# Patient Record
Sex: MALE | Race: WHITE | NOT HISPANIC OR LATINO | Employment: OTHER
[De-identification: names, ages, dates, MRNs, and addresses within clinical notes are randomized per-mention and may not be internally consistent; named-entity substitution may affect disease eponyms.]

---

## 2017-01-05 ENCOUNTER — RECORDS - HEALTHEAST (OUTPATIENT)
Dept: ADMINISTRATIVE | Facility: OTHER | Age: 57
End: 2017-01-05

## 2017-02-10 ENCOUNTER — HOSPITAL ENCOUNTER (OUTPATIENT)
Dept: CT IMAGING | Facility: CLINIC | Age: 57
Discharge: HOME | End: 2017-02-10
Attending: INTERNAL MEDICINE

## 2017-02-10 DIAGNOSIS — C18.9 MALIGNANT NEOPLASM OF COLON, UNSPECIFIED PART OF COLON (H): ICD-10-CM

## 2017-02-17 ENCOUNTER — RECORDS - HEALTHEAST (OUTPATIENT)
Dept: ADMINISTRATIVE | Facility: OTHER | Age: 57
End: 2017-02-17

## 2017-04-17 ENCOUNTER — RECORDS - HEALTHEAST (OUTPATIENT)
Dept: ADMINISTRATIVE | Facility: OTHER | Age: 57
End: 2017-04-17

## 2017-05-01 ENCOUNTER — HOSPITAL ENCOUNTER (OUTPATIENT)
Dept: CT IMAGING | Facility: CLINIC | Age: 57
Discharge: HOME | End: 2017-05-01
Attending: INTERNAL MEDICINE

## 2017-05-01 DIAGNOSIS — C18.9 COLON CANCER (H): ICD-10-CM

## 2017-05-09 ENCOUNTER — RECORDS - HEALTHEAST (OUTPATIENT)
Dept: ADMINISTRATIVE | Facility: OTHER | Age: 57
End: 2017-05-09

## 2017-07-26 ENCOUNTER — RECORDS - HEALTHEAST (OUTPATIENT)
Dept: ADMINISTRATIVE | Facility: OTHER | Age: 57
End: 2017-07-26

## 2017-07-31 ENCOUNTER — RECORDS - HEALTHEAST (OUTPATIENT)
Dept: ADMINISTRATIVE | Facility: OTHER | Age: 57
End: 2017-07-31

## 2017-08-14 ENCOUNTER — HOSPITAL ENCOUNTER (OUTPATIENT)
Dept: CT IMAGING | Facility: CLINIC | Age: 57
Discharge: HOME | End: 2017-08-14
Attending: INTERNAL MEDICINE

## 2017-08-14 ENCOUNTER — HOSPITAL ENCOUNTER (OUTPATIENT)
Dept: MRI IMAGING | Facility: CLINIC | Age: 57
Discharge: HOME | End: 2017-08-14
Attending: NEUROLOGICAL SURGERY

## 2017-08-14 DIAGNOSIS — C18.9 COLON CANCER (H): ICD-10-CM

## 2017-08-14 DIAGNOSIS — I67.1 DAVF (DURAL ARTERIOVENOUS FISTULA): ICD-10-CM

## 2017-08-16 ENCOUNTER — COMMUNICATION - HEALTHEAST (OUTPATIENT)
Dept: INTERNAL MEDICINE | Facility: CLINIC | Age: 57
End: 2017-08-16

## 2017-08-16 DIAGNOSIS — E11.9 DIABETES (H): ICD-10-CM

## 2017-08-21 ENCOUNTER — RECORDS - HEALTHEAST (OUTPATIENT)
Dept: ADMINISTRATIVE | Facility: OTHER | Age: 57
End: 2017-08-21

## 2017-08-29 ENCOUNTER — HOSPITAL ENCOUNTER (OUTPATIENT)
Dept: PET IMAGING | Facility: HOSPITAL | Age: 57
Discharge: HOME | End: 2017-08-29
Attending: INTERNAL MEDICINE

## 2017-08-29 DIAGNOSIS — C20 METASTASIS FROM RECTAL CANCER (H): ICD-10-CM

## 2017-08-29 DIAGNOSIS — C79.9 METASTASIS FROM RECTAL CANCER (H): ICD-10-CM

## 2017-09-18 ENCOUNTER — RECORDS - HEALTHEAST (OUTPATIENT)
Dept: ADMINISTRATIVE | Facility: OTHER | Age: 57
End: 2017-09-18

## 2017-09-22 ENCOUNTER — RECORDS - HEALTHEAST (OUTPATIENT)
Dept: ADMINISTRATIVE | Facility: OTHER | Age: 57
End: 2017-09-22

## 2017-09-26 ENCOUNTER — HOSPITAL ENCOUNTER (OUTPATIENT)
Dept: RESPIRATORY THERAPY | Facility: CLINIC | Age: 57
Discharge: HOME | End: 2017-09-26
Attending: RADIOLOGY

## 2017-09-26 DIAGNOSIS — C78.00 LUNG METASTASES: ICD-10-CM

## 2017-10-10 ENCOUNTER — RECORDS - HEALTHEAST (OUTPATIENT)
Dept: ADMINISTRATIVE | Facility: OTHER | Age: 57
End: 2017-10-10

## 2017-10-11 ENCOUNTER — COMMUNICATION - HEALTHEAST (OUTPATIENT)
Dept: INTERNAL MEDICINE | Facility: CLINIC | Age: 57
End: 2017-10-11

## 2017-10-11 DIAGNOSIS — E11.9 DIABETES (H): ICD-10-CM

## 2017-12-15 ENCOUNTER — OFFICE VISIT - HEALTHEAST (OUTPATIENT)
Dept: INTERNAL MEDICINE | Facility: CLINIC | Age: 57
End: 2017-12-15

## 2017-12-15 DIAGNOSIS — R41.3 MEMORY LOSS: ICD-10-CM

## 2017-12-15 DIAGNOSIS — G40.909 SEIZURE DISORDER (H): ICD-10-CM

## 2017-12-15 DIAGNOSIS — I80.9 THROMBOPHLEBITIS: ICD-10-CM

## 2017-12-15 DIAGNOSIS — C20 MALIGNANT NEOPLASM OF RECTUM (H): ICD-10-CM

## 2017-12-15 DIAGNOSIS — E11.9 TYPE 2 DIABETES MELLITUS (H): ICD-10-CM

## 2017-12-15 LAB — HBA1C MFR BLD: 9 % (ref 3.5–6)

## 2017-12-15 ASSESSMENT — MIFFLIN-ST. JEOR: SCORE: 2065.62

## 2018-01-02 ENCOUNTER — RECORDS - HEALTHEAST (OUTPATIENT)
Dept: ADMINISTRATIVE | Facility: OTHER | Age: 58
End: 2018-01-02

## 2018-01-02 ENCOUNTER — COMMUNICATION - HEALTHEAST (OUTPATIENT)
Dept: INTERNAL MEDICINE | Facility: CLINIC | Age: 58
End: 2018-01-02

## 2018-01-02 DIAGNOSIS — E11.9 DIABETES (H): ICD-10-CM

## 2018-01-08 ENCOUNTER — RECORDS - HEALTHEAST (OUTPATIENT)
Dept: ADMINISTRATIVE | Facility: OTHER | Age: 58
End: 2018-01-08

## 2018-03-29 ENCOUNTER — COMMUNICATION - HEALTHEAST (OUTPATIENT)
Dept: SCHEDULING | Facility: CLINIC | Age: 58
End: 2018-03-29

## 2018-03-29 ENCOUNTER — COMMUNICATION - HEALTHEAST (OUTPATIENT)
Dept: INTERNAL MEDICINE | Facility: CLINIC | Age: 58
End: 2018-03-29

## 2018-03-30 ENCOUNTER — COMMUNICATION - HEALTHEAST (OUTPATIENT)
Dept: INTERNAL MEDICINE | Facility: CLINIC | Age: 58
End: 2018-03-30

## 2018-04-02 ENCOUNTER — COMMUNICATION - HEALTHEAST (OUTPATIENT)
Dept: INTERNAL MEDICINE | Facility: CLINIC | Age: 58
End: 2018-04-02

## 2018-04-05 ENCOUNTER — RECORDS - HEALTHEAST (OUTPATIENT)
Dept: ADMINISTRATIVE | Facility: OTHER | Age: 58
End: 2018-04-05

## 2018-04-06 ENCOUNTER — RECORDS - HEALTHEAST (OUTPATIENT)
Dept: ADMINISTRATIVE | Facility: OTHER | Age: 58
End: 2018-04-06

## 2018-04-23 ENCOUNTER — HOSPITAL ENCOUNTER (OUTPATIENT)
Dept: CT IMAGING | Facility: CLINIC | Age: 58
Discharge: HOME | End: 2018-04-23
Attending: INTERNAL MEDICINE

## 2018-04-23 DIAGNOSIS — C18.9 COLON CANCER (H): ICD-10-CM

## 2018-04-23 LAB
CREAT BLD-MCNC: 0.9 MG/DL
POC GFR AMER AF HE - HISTORICAL: >60 ML/MIN/1.73M2
POC GFR NON AMER AF HE - HISTORICAL: >60 ML/MIN/1.73M2

## 2018-06-28 ENCOUNTER — RECORDS - HEALTHEAST (OUTPATIENT)
Dept: ADMINISTRATIVE | Facility: OTHER | Age: 58
End: 2018-06-28

## 2018-08-13 ENCOUNTER — HOSPITAL ENCOUNTER (OUTPATIENT)
Dept: CT IMAGING | Facility: CLINIC | Age: 58
Discharge: HOME | End: 2018-08-13

## 2018-08-13 ENCOUNTER — TRANSFERRED RECORDS (OUTPATIENT)
Dept: HEALTH INFORMATION MANAGEMENT | Facility: CLINIC | Age: 58
End: 2018-08-13

## 2018-08-13 DIAGNOSIS — C18.9 COLON CANCER (H): ICD-10-CM

## 2018-08-13 LAB
CREAT BLD-MCNC: 0.8 MG/DL
POC GFR AMER AF HE - HISTORICAL: >60 ML/MIN/1.73M2
POC GFR NON AMER AF HE - HISTORICAL: >60 ML/MIN/1.73M2

## 2018-08-29 ENCOUNTER — OFFICE VISIT - HEALTHEAST (OUTPATIENT)
Dept: INTERNAL MEDICINE | Facility: CLINIC | Age: 58
End: 2018-08-29

## 2018-08-29 DIAGNOSIS — E66.01 MORBID OBESITY (H): ICD-10-CM

## 2018-08-29 DIAGNOSIS — E11.9 DIABETES MELLITUS (H): ICD-10-CM

## 2018-08-29 DIAGNOSIS — C78.00 METASTASIS TO LUNG (H): ICD-10-CM

## 2018-08-29 DIAGNOSIS — Z12.5 SCREENING FOR PROSTATE CANCER: ICD-10-CM

## 2018-08-29 DIAGNOSIS — Z12.11 SCREEN FOR COLON CANCER: ICD-10-CM

## 2018-08-29 DIAGNOSIS — E11.9 DIABETES (H): ICD-10-CM

## 2018-08-29 DIAGNOSIS — E11.9 TYPE 2 DIABETES MELLITUS (H): ICD-10-CM

## 2018-08-29 LAB
ALBUMIN SERPL-MCNC: 3.7 G/DL (ref 3.5–5)
ALP SERPL-CCNC: 76 U/L (ref 45–120)
ALT SERPL W P-5'-P-CCNC: 58 U/L (ref 0–45)
ANION GAP SERPL CALCULATED.3IONS-SCNC: 11 MMOL/L (ref 5–18)
AST SERPL W P-5'-P-CCNC: 31 U/L (ref 0–40)
BILIRUB SERPL-MCNC: 1.2 MG/DL (ref 0–1)
BUN SERPL-MCNC: 12 MG/DL (ref 8–22)
CALCIUM SERPL-MCNC: 9.4 MG/DL (ref 8.5–10.5)
CHLORIDE BLD-SCNC: 105 MMOL/L (ref 98–107)
CHOLEST SERPL-MCNC: 213 MG/DL
CO2 SERPL-SCNC: 26 MMOL/L (ref 22–31)
CREAT SERPL-MCNC: 0.88 MG/DL (ref 0.7–1.3)
CREAT UR-MCNC: 215.7 MG/DL
FASTING STATUS PATIENT QL REPORTED: YES
GFR SERPL CREATININE-BSD FRML MDRD: >60 ML/MIN/1.73M2
GLUCOSE BLD-MCNC: 164 MG/DL (ref 70–125)
HBA1C MFR BLD: 7.9 % (ref 3.5–6)
HDLC SERPL-MCNC: 36 MG/DL
LDLC SERPL CALC-MCNC: 126 MG/DL
MICROALBUMIN UR-MCNC: 1.1 MG/DL (ref 0–1.99)
MICROALBUMIN/CREAT UR: 5.1 MG/G
POTASSIUM BLD-SCNC: 4.3 MMOL/L (ref 3.5–5)
PROT SERPL-MCNC: 6.8 G/DL (ref 6–8)
PSA SERPL-MCNC: 0.5 NG/ML (ref 0–3.5)
SODIUM SERPL-SCNC: 142 MMOL/L (ref 136–145)
TRIGL SERPL-MCNC: 257 MG/DL

## 2018-08-29 ASSESSMENT — MIFFLIN-ST. JEOR: SCORE: 2073.84

## 2018-09-25 ENCOUNTER — RECORDS - HEALTHEAST (OUTPATIENT)
Dept: ADMINISTRATIVE | Facility: OTHER | Age: 58
End: 2018-09-25

## 2018-12-12 ENCOUNTER — RECORDS - HEALTHEAST (OUTPATIENT)
Dept: ADMINISTRATIVE | Facility: OTHER | Age: 58
End: 2018-12-12

## 2018-12-12 ENCOUNTER — HOSPITAL ENCOUNTER (OUTPATIENT)
Dept: CT IMAGING | Facility: CLINIC | Age: 58
Discharge: HOME | End: 2018-12-12
Attending: INTERNAL MEDICINE

## 2018-12-12 DIAGNOSIS — C18.9 MALIGNANT NEOPLASM OF COLON, UNSPECIFIED PART OF COLON (H): ICD-10-CM

## 2018-12-19 ENCOUNTER — RECORDS - HEALTHEAST (OUTPATIENT)
Dept: ADMINISTRATIVE | Facility: OTHER | Age: 58
End: 2018-12-19

## 2019-03-18 ENCOUNTER — HOSPITAL ENCOUNTER (OUTPATIENT)
Dept: CT IMAGING | Facility: CLINIC | Age: 59
Discharge: HOME | End: 2019-03-18
Attending: INTERNAL MEDICINE

## 2019-03-18 DIAGNOSIS — C18.9 COLON CANCER (H): ICD-10-CM

## 2019-03-25 ENCOUNTER — RECORDS - HEALTHEAST (OUTPATIENT)
Dept: ADMINISTRATIVE | Facility: OTHER | Age: 59
End: 2019-03-25

## 2019-04-04 ENCOUNTER — COMMUNICATION - HEALTHEAST (OUTPATIENT)
Dept: INTERNAL MEDICINE | Facility: CLINIC | Age: 59
End: 2019-04-04

## 2019-04-04 DIAGNOSIS — E11.40 TYPE 2 DIABETES MELLITUS WITH DIABETIC NEUROPATHY, WITHOUT LONG-TERM CURRENT USE OF INSULIN (H): ICD-10-CM

## 2019-04-22 ENCOUNTER — RECORDS - HEALTHEAST (OUTPATIENT)
Dept: ADMINISTRATIVE | Facility: OTHER | Age: 59
End: 2019-04-22

## 2019-05-29 ENCOUNTER — COMMUNICATION - HEALTHEAST (OUTPATIENT)
Dept: FAMILY MEDICINE | Facility: CLINIC | Age: 59
End: 2019-05-29

## 2019-06-17 ENCOUNTER — HOSPITAL ENCOUNTER (OUTPATIENT)
Dept: CT IMAGING | Facility: CLINIC | Age: 59
Discharge: HOME | End: 2019-06-17
Attending: INTERNAL MEDICINE

## 2019-06-17 ENCOUNTER — OFFICE VISIT - HEALTHEAST (OUTPATIENT)
Dept: INTERNAL MEDICINE | Facility: CLINIC | Age: 59
End: 2019-06-17

## 2019-06-17 DIAGNOSIS — E11.9 TYPE 2 DIABETES MELLITUS WITHOUT COMPLICATION, WITH LONG-TERM CURRENT USE OF INSULIN (H): ICD-10-CM

## 2019-06-17 DIAGNOSIS — C78.00 MALIGNANT NEOPLASM METASTATIC TO LUNG, UNSPECIFIED LATERALITY (H): ICD-10-CM

## 2019-06-17 DIAGNOSIS — Z79.4 TYPE 2 DIABETES MELLITUS WITHOUT COMPLICATION, WITH LONG-TERM CURRENT USE OF INSULIN (H): ICD-10-CM

## 2019-06-17 DIAGNOSIS — C18.9 COLON CANCER (H): ICD-10-CM

## 2019-06-17 DIAGNOSIS — C78.7 LIVER METASTASIS: ICD-10-CM

## 2019-06-17 DIAGNOSIS — C20 RECTAL CANCER (H): ICD-10-CM

## 2019-06-17 LAB
CREAT BLD-MCNC: 0.9 MG/DL (ref 0.7–1.3)
CREAT UR-MCNC: 44.2 MG/DL
GFR SERPL CREATININE-BSD FRML MDRD: >60 ML/MIN/1.73M2
HBA1C MFR BLD: 7.4 % (ref 3.5–6)
LDLC SERPL CALC-MCNC: 150 MG/DL
MICROALBUMIN UR-MCNC: <0.5 MG/DL (ref 0–1.99)
MICROALBUMIN/CREAT UR: NORMAL MG/G

## 2019-06-27 ENCOUNTER — RECORDS - HEALTHEAST (OUTPATIENT)
Dept: ADMINISTRATIVE | Facility: OTHER | Age: 59
End: 2019-06-27

## 2019-07-06 ENCOUNTER — COMMUNICATION - HEALTHEAST (OUTPATIENT)
Dept: INTERNAL MEDICINE | Facility: CLINIC | Age: 59
End: 2019-07-06

## 2019-07-06 DIAGNOSIS — E11.9 DIABETES (H): ICD-10-CM

## 2019-07-09 ENCOUNTER — RECORDS - HEALTHEAST (OUTPATIENT)
Dept: ADMINISTRATIVE | Facility: OTHER | Age: 59
End: 2019-07-09

## 2019-07-11 ENCOUNTER — HOSPITAL ENCOUNTER (OUTPATIENT)
Dept: RESPIRATORY THERAPY | Facility: CLINIC | Age: 59
Discharge: HOME | End: 2019-07-11
Attending: RADIOLOGY

## 2019-07-11 DIAGNOSIS — C78.00 MALIGNANT NEOPLASM METASTATIC TO LUNG, UNSPECIFIED LATERALITY (H): ICD-10-CM

## 2019-07-11 LAB — HGB BLD-MCNC: 15.6 G/DL (ref 14–18)

## 2019-07-25 ENCOUNTER — COMMUNICATION - HEALTHEAST (OUTPATIENT)
Dept: INTERNAL MEDICINE | Facility: CLINIC | Age: 59
End: 2019-07-25

## 2019-07-30 ENCOUNTER — OFFICE VISIT - HEALTHEAST (OUTPATIENT)
Dept: INTERNAL MEDICINE | Facility: CLINIC | Age: 59
End: 2019-07-30

## 2019-07-30 DIAGNOSIS — Z12.11 SCREEN FOR COLON CANCER: ICD-10-CM

## 2019-07-30 DIAGNOSIS — Z01.818 PRE-OP EXAM: ICD-10-CM

## 2019-07-30 LAB
ATRIAL RATE - MUSE: 59 BPM
DIASTOLIC BLOOD PRESSURE - MUSE: NORMAL MMHG
INTERPRETATION ECG - MUSE: NORMAL
P AXIS - MUSE: 27 DEGREES
PR INTERVAL - MUSE: 164 MS
QRS DURATION - MUSE: 94 MS
QT - MUSE: 404 MS
QTC - MUSE: 399 MS
R AXIS - MUSE: 12 DEGREES
SYSTOLIC BLOOD PRESSURE - MUSE: NORMAL MMHG
T AXIS - MUSE: 46 DEGREES
VENTRICULAR RATE- MUSE: 59 BPM

## 2019-08-02 ENCOUNTER — RECORDS - HEALTHEAST (OUTPATIENT)
Dept: ADMINISTRATIVE | Facility: OTHER | Age: 59
End: 2019-08-02

## 2019-08-08 ENCOUNTER — COMMUNICATION - HEALTHEAST (OUTPATIENT)
Dept: INTERNAL MEDICINE | Facility: CLINIC | Age: 59
End: 2019-08-08

## 2019-08-08 DIAGNOSIS — E11.9 DIABETES (H): ICD-10-CM

## 2019-08-14 ENCOUNTER — RECORDS - HEALTHEAST (OUTPATIENT)
Dept: ADMINISTRATIVE | Facility: OTHER | Age: 59
End: 2019-08-14

## 2019-08-30 ENCOUNTER — COMMUNICATION - HEALTHEAST (OUTPATIENT)
Dept: INTERNAL MEDICINE | Facility: CLINIC | Age: 59
End: 2019-08-30

## 2019-09-04 ENCOUNTER — COMMUNICATION - HEALTHEAST (OUTPATIENT)
Dept: INTERNAL MEDICINE | Facility: CLINIC | Age: 59
End: 2019-09-04

## 2019-10-08 ENCOUNTER — HOSPITAL ENCOUNTER (OUTPATIENT)
Dept: CT IMAGING | Facility: CLINIC | Age: 59
Discharge: HOME | End: 2019-10-08
Attending: INTERNAL MEDICINE

## 2019-10-08 DIAGNOSIS — C18.9 MALIGNANT NEOPLASM OF COLON, UNSPECIFIED PART OF COLON (H): ICD-10-CM

## 2019-10-08 LAB
CREAT BLD-MCNC: 0.8 MG/DL (ref 0.7–1.3)
GFR SERPL CREATININE-BSD FRML MDRD: >60 ML/MIN/1.73M2

## 2019-10-10 ENCOUNTER — COMMUNICATION - HEALTHEAST (OUTPATIENT)
Dept: INTERNAL MEDICINE | Facility: CLINIC | Age: 59
End: 2019-10-10

## 2019-10-10 DIAGNOSIS — E11.9 DIABETES MELLITUS (H): ICD-10-CM

## 2019-10-25 ENCOUNTER — COMMUNICATION - HEALTHEAST (OUTPATIENT)
Dept: INTERNAL MEDICINE | Facility: CLINIC | Age: 59
End: 2019-10-25

## 2019-12-02 ENCOUNTER — RECORDS - HEALTHEAST (OUTPATIENT)
Dept: ADMINISTRATIVE | Facility: OTHER | Age: 59
End: 2019-12-02

## 2019-12-19 ENCOUNTER — RECORDS - HEALTHEAST (OUTPATIENT)
Dept: ADMINISTRATIVE | Facility: OTHER | Age: 59
End: 2019-12-19

## 2019-12-30 ENCOUNTER — HOSPITAL ENCOUNTER (OUTPATIENT)
Dept: CT IMAGING | Facility: CLINIC | Age: 59
Discharge: HOME | End: 2019-12-30
Attending: INTERNAL MEDICINE

## 2019-12-30 DIAGNOSIS — C20 RECTAL CANCER (H): ICD-10-CM

## 2020-01-06 ENCOUNTER — RECORDS - HEALTHEAST (OUTPATIENT)
Dept: ADMINISTRATIVE | Facility: OTHER | Age: 60
End: 2020-01-06

## 2020-01-07 ENCOUNTER — RECORDS - HEALTHEAST (OUTPATIENT)
Dept: ADMINISTRATIVE | Facility: OTHER | Age: 60
End: 2020-01-07

## 2020-02-06 ENCOUNTER — COMMUNICATION - HEALTHEAST (OUTPATIENT)
Dept: INTERNAL MEDICINE | Facility: CLINIC | Age: 60
End: 2020-02-06

## 2020-02-06 DIAGNOSIS — Z79.4 TYPE 2 DIABETES MELLITUS WITHOUT COMPLICATION, WITH LONG-TERM CURRENT USE OF INSULIN (H): ICD-10-CM

## 2020-02-06 DIAGNOSIS — E11.9 TYPE 2 DIABETES MELLITUS WITHOUT COMPLICATION, WITH LONG-TERM CURRENT USE OF INSULIN (H): ICD-10-CM

## 2020-03-13 ENCOUNTER — RECORDS - HEALTHEAST (OUTPATIENT)
Dept: ADMINISTRATIVE | Facility: OTHER | Age: 60
End: 2020-03-13

## 2020-03-23 ENCOUNTER — HOSPITAL ENCOUNTER (OUTPATIENT)
Dept: CT IMAGING | Facility: CLINIC | Age: 60
Discharge: HOME | End: 2020-03-23
Attending: INTERNAL MEDICINE

## 2020-03-23 DIAGNOSIS — C20 RECTAL CANCER (H): ICD-10-CM

## 2020-03-23 LAB
CREAT BLD-MCNC: 0.9 MG/DL (ref 0.7–1.3)
GFR SERPL CREATININE-BSD FRML MDRD: >60 ML/MIN/1.73M2

## 2020-03-30 ENCOUNTER — RECORDS - HEALTHEAST (OUTPATIENT)
Dept: ADMINISTRATIVE | Facility: OTHER | Age: 60
End: 2020-03-30

## 2020-06-17 ENCOUNTER — HOSPITAL ENCOUNTER (OUTPATIENT)
Dept: CT IMAGING | Facility: CLINIC | Age: 60
Discharge: HOME | End: 2020-06-17
Attending: INTERNAL MEDICINE

## 2020-06-17 DIAGNOSIS — C20 MALIGNANT NEOPLASM OF RECTUM (H): ICD-10-CM

## 2020-07-14 ENCOUNTER — RECORDS - HEALTHEAST (OUTPATIENT)
Dept: ADMINISTRATIVE | Facility: OTHER | Age: 60
End: 2020-07-14

## 2020-07-20 ENCOUNTER — OFFICE VISIT - HEALTHEAST (OUTPATIENT)
Dept: FAMILY MEDICINE | Facility: CLINIC | Age: 60
End: 2020-07-20

## 2020-07-20 DIAGNOSIS — Z79.4 TYPE 2 DIABETES MELLITUS WITHOUT COMPLICATION, WITH LONG-TERM CURRENT USE OF INSULIN (H): ICD-10-CM

## 2020-07-20 DIAGNOSIS — E11.9 TYPE 2 DIABETES MELLITUS WITHOUT COMPLICATION, WITH LONG-TERM CURRENT USE OF INSULIN (H): ICD-10-CM

## 2020-07-20 DIAGNOSIS — G40.909 SEIZURE DISORDER (H): ICD-10-CM

## 2020-07-20 DIAGNOSIS — C20 RECTAL CANCER (H): ICD-10-CM

## 2020-07-20 DIAGNOSIS — E66.01 MORBID OBESITY (H): ICD-10-CM

## 2020-07-20 DIAGNOSIS — Z01.818 PREOPERATIVE EXAMINATION: ICD-10-CM

## 2020-07-20 DIAGNOSIS — C78.00 MALIGNANT NEOPLASM METASTATIC TO LUNG, UNSPECIFIED LATERALITY (H): ICD-10-CM

## 2020-07-20 LAB
ANION GAP SERPL CALCULATED.3IONS-SCNC: 8 MMOL/L (ref 5–18)
ATRIAL RATE - MUSE: 63 BPM
BASOPHILS # BLD AUTO: 0 THOU/UL (ref 0–0.2)
BASOPHILS NFR BLD AUTO: 1 % (ref 0–2)
BUN SERPL-MCNC: 10 MG/DL (ref 8–22)
CALCIUM SERPL-MCNC: 8.7 MG/DL (ref 8.5–10.5)
CHLORIDE BLD-SCNC: 106 MMOL/L (ref 98–107)
CO2 SERPL-SCNC: 26 MMOL/L (ref 22–31)
CREAT SERPL-MCNC: 0.82 MG/DL (ref 0.7–1.3)
DIASTOLIC BLOOD PRESSURE - MUSE: NORMAL
EOSINOPHIL # BLD AUTO: 0.2 THOU/UL (ref 0–0.4)
EOSINOPHIL NFR BLD AUTO: 4 % (ref 0–6)
ERYTHROCYTE [DISTWIDTH] IN BLOOD BY AUTOMATED COUNT: 12.3 % (ref 11–14.5)
GFR SERPL CREATININE-BSD FRML MDRD: >60 ML/MIN/1.73M2
GLUCOSE BLD-MCNC: 158 MG/DL (ref 70–125)
HBA1C MFR BLD: 8 % (ref 3.5–6)
HCT VFR BLD AUTO: 45.9 % (ref 40–54)
HGB BLD-MCNC: 15.3 G/DL (ref 14–18)
INTERPRETATION ECG - MUSE: NORMAL
LYMPHOCYTES # BLD AUTO: 1.1 THOU/UL (ref 0.8–4.4)
LYMPHOCYTES NFR BLD AUTO: 24 % (ref 20–40)
MCH RBC QN AUTO: 29.6 PG (ref 27–34)
MCHC RBC AUTO-ENTMCNC: 33.3 G/DL (ref 32–36)
MCV RBC AUTO: 89 FL (ref 80–100)
MONOCYTES # BLD AUTO: 0.4 THOU/UL (ref 0–0.9)
MONOCYTES NFR BLD AUTO: 9 % (ref 2–10)
NEUTROPHILS # BLD AUTO: 2.8 THOU/UL (ref 2–7.7)
NEUTROPHILS NFR BLD AUTO: 63 % (ref 50–70)
P AXIS - MUSE: 38 DEGREES
PLATELET # BLD AUTO: 158 THOU/UL (ref 140–440)
PMV BLD AUTO: 7.3 FL (ref 7–10)
POTASSIUM BLD-SCNC: 4.4 MMOL/L (ref 3.5–5)
PR INTERVAL - MUSE: 166 MS
QRS DURATION - MUSE: 98 MS
QT - MUSE: 420 MS
QTC - MUSE: 429 MS
R AXIS - MUSE: 17 DEGREES
RBC # BLD AUTO: 5.17 MILL/UL (ref 4.4–6.2)
SODIUM SERPL-SCNC: 140 MMOL/L (ref 136–145)
SYSTOLIC BLOOD PRESSURE - MUSE: NORMAL
T AXIS - MUSE: 43 DEGREES
VENTRICULAR RATE- MUSE: 63 BPM
WBC: 4.4 THOU/UL (ref 4–11)

## 2020-09-09 ENCOUNTER — HOSPITAL ENCOUNTER (OUTPATIENT)
Dept: CT IMAGING | Facility: CLINIC | Age: 60
Discharge: HOME | End: 2020-09-09
Attending: INTERNAL MEDICINE

## 2020-09-09 DIAGNOSIS — C20 RECTAL CANCER (H): ICD-10-CM

## 2020-09-09 LAB
CREAT BLD-MCNC: 0.9 MG/DL (ref 0.7–1.3)
GFR SERPL CREATININE-BSD FRML MDRD: >60 ML/MIN/1.73M2

## 2020-09-16 ENCOUNTER — RECORDS - HEALTHEAST (OUTPATIENT)
Dept: ADMINISTRATIVE | Facility: OTHER | Age: 60
End: 2020-09-16

## 2020-09-30 ENCOUNTER — RECORDS - HEALTHEAST (OUTPATIENT)
Dept: ADMINISTRATIVE | Facility: OTHER | Age: 60
End: 2020-09-30

## 2020-12-07 ENCOUNTER — HOSPITAL ENCOUNTER (OUTPATIENT)
Dept: CT IMAGING | Facility: CLINIC | Age: 60
Discharge: HOME | End: 2020-12-07
Attending: INTERNAL MEDICINE

## 2020-12-07 DIAGNOSIS — C20 RECTAL CANCER (H): ICD-10-CM

## 2020-12-07 LAB
CREAT BLD-MCNC: 0.9 MG/DL (ref 0.7–1.3)
GFR SERPL CREATININE-BSD FRML MDRD: >60 ML/MIN/1.73M2

## 2020-12-16 ENCOUNTER — RECORDS - HEALTHEAST (OUTPATIENT)
Dept: ADMINISTRATIVE | Facility: OTHER | Age: 60
End: 2020-12-16

## 2020-12-17 ENCOUNTER — RECORDS - HEALTHEAST (OUTPATIENT)
Dept: ADMINISTRATIVE | Facility: OTHER | Age: 60
End: 2020-12-17

## 2021-02-13 ENCOUNTER — COMMUNICATION - HEALTHEAST (OUTPATIENT)
Dept: INTERNAL MEDICINE | Facility: CLINIC | Age: 61
End: 2021-02-13

## 2021-02-13 DIAGNOSIS — E11.9 TYPE 2 DIABETES MELLITUS WITHOUT COMPLICATION, WITH LONG-TERM CURRENT USE OF INSULIN (H): ICD-10-CM

## 2021-02-13 DIAGNOSIS — Z79.4 TYPE 2 DIABETES MELLITUS WITHOUT COMPLICATION, WITH LONG-TERM CURRENT USE OF INSULIN (H): ICD-10-CM

## 2021-02-25 ENCOUNTER — RECORDS - HEALTHEAST (OUTPATIENT)
Dept: ADMINISTRATIVE | Facility: OTHER | Age: 61
End: 2021-02-25

## 2021-03-08 ENCOUNTER — OFFICE VISIT - HEALTHEAST (OUTPATIENT)
Dept: INTERNAL MEDICINE | Facility: CLINIC | Age: 61
End: 2021-03-08

## 2021-03-08 DIAGNOSIS — G62.0 CHEMOTHERAPY-INDUCED PERIPHERAL NEUROPATHY (H): ICD-10-CM

## 2021-03-08 DIAGNOSIS — Z79.4 TYPE 2 DIABETES MELLITUS WITHOUT COMPLICATION, WITH LONG-TERM CURRENT USE OF INSULIN (H): ICD-10-CM

## 2021-03-08 DIAGNOSIS — C20 RECTAL CANCER (H): ICD-10-CM

## 2021-03-08 DIAGNOSIS — E11.9 TYPE 2 DIABETES MELLITUS WITHOUT COMPLICATION, WITH LONG-TERM CURRENT USE OF INSULIN (H): ICD-10-CM

## 2021-03-08 DIAGNOSIS — T45.1X5A CHEMOTHERAPY-INDUCED PERIPHERAL NEUROPATHY (H): ICD-10-CM

## 2021-03-08 LAB
ALBUMIN SERPL-MCNC: 3.6 G/DL (ref 3.5–5)
ALP SERPL-CCNC: 69 U/L (ref 45–120)
ALT SERPL W P-5'-P-CCNC: 49 U/L (ref 0–45)
ANION GAP SERPL CALCULATED.3IONS-SCNC: 9 MMOL/L (ref 5–18)
AST SERPL W P-5'-P-CCNC: 23 U/L (ref 0–40)
BILIRUB SERPL-MCNC: 0.8 MG/DL (ref 0–1)
BUN SERPL-MCNC: 12 MG/DL (ref 8–22)
CALCIUM SERPL-MCNC: 8.5 MG/DL (ref 8.5–10.5)
CHLORIDE BLD-SCNC: 105 MMOL/L (ref 98–107)
CHOLEST SERPL-MCNC: 186 MG/DL
CO2 SERPL-SCNC: 26 MMOL/L (ref 22–31)
CREAT SERPL-MCNC: 0.83 MG/DL (ref 0.7–1.3)
CREAT UR-MCNC: 228.1 MG/DL
FASTING STATUS PATIENT QL REPORTED: ABNORMAL
GFR SERPL CREATININE-BSD FRML MDRD: >60 ML/MIN/1.73M2
GLUCOSE BLD-MCNC: 168 MG/DL (ref 70–125)
HBA1C MFR BLD: 7.6 %
HDLC SERPL-MCNC: 36 MG/DL
LDLC SERPL CALC-MCNC: 119 MG/DL
MICROALBUMIN UR-MCNC: 1.12 MG/DL (ref 0–1.99)
MICROALBUMIN/CREAT UR: 4.9 MG/G
POTASSIUM BLD-SCNC: 4.4 MMOL/L (ref 3.5–5)
PROT SERPL-MCNC: 6.6 G/DL (ref 6–8)
SODIUM SERPL-SCNC: 140 MMOL/L (ref 136–145)
TRIGL SERPL-MCNC: 154 MG/DL

## 2021-03-14 RX ORDER — ATORVASTATIN CALCIUM 40 MG/1
40 TABLET, FILM COATED ORAL DAILY
Qty: 90 TABLET | Refills: 1 | Status: SHIPPED | OUTPATIENT
Start: 2021-03-14 | End: 2021-08-31

## 2021-03-15 ENCOUNTER — HOSPITAL ENCOUNTER (OUTPATIENT)
Dept: CT IMAGING | Facility: CLINIC | Age: 61
Discharge: HOME | End: 2021-03-15
Attending: INTERNAL MEDICINE

## 2021-03-15 DIAGNOSIS — C20 RECTAL CANCER (H): ICD-10-CM

## 2021-03-22 ENCOUNTER — RECORDS - HEALTHEAST (OUTPATIENT)
Dept: ADMINISTRATIVE | Facility: OTHER | Age: 61
End: 2021-03-22

## 2021-03-23 ENCOUNTER — AMBULATORY - HEALTHEAST (OUTPATIENT)
Dept: NURSING | Facility: CLINIC | Age: 61
End: 2021-03-23

## 2021-03-26 ENCOUNTER — RECORDS - HEALTHEAST (OUTPATIENT)
Dept: ADMINISTRATIVE | Facility: OTHER | Age: 61
End: 2021-03-26

## 2021-04-13 ENCOUNTER — AMBULATORY - HEALTHEAST (OUTPATIENT)
Dept: NURSING | Facility: CLINIC | Age: 61
End: 2021-04-13

## 2021-05-27 NOTE — TELEPHONE ENCOUNTER
RN cannot approve Refill Request    RN can NOT refill this medication overdue for office visits and/or labs.    Mundo Figueroa, [facility] Triage/Med Refill 4/4/2019    Requested Prescriptions   Pending Prescriptions Disp Refills     BASAGLAR KWIKPEN U-100 INSULIN 100 unit/mL (3 mL) pen [Pharmacy Med Name: BASAGLAR 100 UNIT/ML KWIKPEN]  9     Sig: INJECT 65 UNITS UNDER THE SKIN AT BEDTIME.    Insulin/GLP-1 Refill Protocol Failed - 4/4/2019  1:32 AM       Failed - Visit with PCP or prescribing provider visit in last 6 months    Last office visit with prescriber/PCP: Visit date not found OR same dept: 8/29/2018 Darrius Lagunas MD OR same specialty: 8/29/2018 Darrius Lagunas MD Last physical: Visit date not found Last MTM visit: Visit date not found     Next appt within 3 mo: Visit date not found  Next physical within 3 mo: Visit date not found  Prescriber OR PCP: Jordy Espinal MD  Last diagnosis associated with med order: There are no diagnoses linked to this encounter.  If protocol passes may refill for 6 months if within 3 months of last provider visit (or a total of 9 months).             Failed - A1C in last 6 months    Hemoglobin A1c   Date Value Ref Range Status   08/29/2018 7.9 (H) 3.5 - 6.0 % Final              Passed - Microalbumin in last year    Microalbumin, Random Urine   Date Value Ref Range Status   08/29/2018 1.10 0.00 - 1.99 mg/dL Final                 Passed - Blood pressure in last year    BP Readings from Last 1 Encounters:   08/29/18 118/82            Passed - Creatinine done in last year    Creatinine   Date Value Ref Range Status   08/29/2018 0.88 0.70 - 1.30 mg/dL Final

## 2021-05-29 NOTE — PROGRESS NOTES
ASSESSMENT and PLAN:  #1.  Type 2 diabetes, degree of control be determined.  Check a hemoglobin A1c, LDL, and urine microalbumin.  I will call him with results and further recommendations.    2.  History of rectal cancer with both lung and liver metastases.  He just had a CT scan performed this morning and is following up with his oncologist, Dr. Gipson, in the next couple of weeks.    #3.  Hyperlipidemia.  Last LDL was 126 in August 2018.  Go to recheck it today and if it is elevated we will need to get him started on a statin.  He is open to this.    Problem List Items Addressed This Visit     Rectal cancer (H)    Type 2 diabetes mellitus (H) - Primary    Relevant Orders    Glycosylated Hemoglobin A1c    LDL Cholesterol, Direct    Microalbumin, Random Urine    Liver metastasis (H)    Metastasis to lung (H)          There are no Patient Instructions on file for this visit.    There are no discontinued medications.    No follow-ups on file.    CHIEF COMPLAINT:  Chief Complaint   Patient presents with     Diabetes     fasting       HISTORY OF PRESENT ILLNESS:  Hero Harris is a 59 y.o. male  presenting to the clinic today for follow-up of his type 2 diabetes.  He is currently taking 46 units of Basaglar along with 12 to 16 units of NovoLog with every meal.  He does not check his blood sugar.  He denies any symptomatic hypoglycemia or need for assistance.  Last hemoglobin A1c in August was 7.9.  Last LDL at that time was 126.  Urine microalbumin was normal then.  He sees his oncologist every 3 months and just had a CT scan this morning.  Unfortunately do not have any records from his last few visits, last records that we have are from September 2018.    REVIEW OF SYSTEMS:   Pertinent positives noted in HPI, remainder of ROS is negative.    PFSH:      MEDICATIONS:  Current Outpatient Medications   Medication Sig Dispense Refill     BASAGLAR KWIKPEN U-100 INSULIN 100 unit/mL (3 mL) pen INJECT 65 UNITS UNDER THE  "SKIN AT BEDTIME. (Patient taking differently: INJECT 46 UNITS UNDER THE SKIN AT BEDTIME.) 15 adj dose pen 9     blood glucose test (ACCU-CHEK SMARTVIEW TEST STRIP) strips Use 1 each As Directed as needed. 100 strip 3     insulin aspart U-100 (NOVOLOG FLEXPEN U-100 INSULIN) 100 unit/mL injection pen INJECT 12 UNITS UNDER THE SKIN THREE TIMES DAILY BEFORE MEALS (Patient taking differently: 12-16 Units. INJECT 12 UNITS UNDER THE SKIN THREE TIMES DAILY BEFORE MEALS      ) 30 mL 2     levETIRAcetam (KEPPRA) 750 MG tablet Take 750 mg by mouth 2 (two) times a day.       pen needle, diabetic (BD ULTRA-FINE SHORT PEN NEEDLE) 31 gauge x 5/16\" Ndle USE AS DIRECTED FOR INSULIN INJECTIONS 4X DAILY 400 each 2     warfarin (COUMADIN) 5 MG tablet Take 5-7.5 mg by mouth See Admin Instructions. 5 mg Sat & Sun, 7.5mg all other days       No current facility-administered medications for this visit.        TOBACCO USE:  Social History     Tobacco Use   Smoking Status Never Smoker   Smokeless Tobacco Never Used       VITALS:  Vitals:    06/17/19 0943   BP: 120/82   Pulse: 68   Weight: (!) 270 lb (122.5 kg)     Wt Readings from Last 3 Encounters:   06/17/19 (!) 270 lb (122.5 kg)   08/29/18 (!) 272 lb (123.4 kg)   12/15/17 (!) 270 lb 3.2 oz (122.6 kg)         PHYSICAL EXAM:  Constitutional:  Reveals an alert, pleasant  male.   Vitals:  Per nursing notes.   Body mass index is 37.13 kg/m .  HEET: Normocephalic, without obvious abnormality, atraumatic. PERRL, conjunctiva/corneas clear, EOM's intact. External canals, TMs clear.   Neurologic: Sensation is mildly decreased to monofilament testing in the feet bilaterally.      "

## 2021-05-29 NOTE — TELEPHONE ENCOUNTER
Please fill out Diabetic Form for Driving and call patient @ [phone] when it is completed to come and . Form placed at [facility]  provider mailbox. Thank you

## 2021-05-29 NOTE — TELEPHONE ENCOUNTER
Pt informed he can  form at his convenience. Form is at . Pt also scheduled for diabetes follow up with PCP for 6/17/19

## 2021-05-30 NOTE — TELEPHONE ENCOUNTER
Refill Approved    Rx renewed per Medication Renewal Policy. Medication was last renewed on 8/29/2018 .        Jean Pike, [facility] Triage/Med Refill 7/7/2019     Requested Prescriptions   Pending Prescriptions Disp Refills     insulin aspart U-100 (NOVOLOG FLEXPEN U-100 INSULIN) 100 unit/mL (3 mL) injection pen [Pharmacy Med Name: NOVOLOG 100 UNIT/ML FLEXPEN]  3     Sig: INJECT 12 UNITS UNDER THE SKIN THREE TIMES DAILY BEFORE MEALS       Insulin/GLP-1 Refill Protocol Passed - 7/6/2019  8:39 AM        Passed - Visit with PCP or prescribing provider visit in last 6 months     Last office visit with prescriber/PCP: 6/17/2019 OR same dept: 6/17/2019 Darrius Lagunas MD OR same specialty: 6/17/2019 Darrius Lagunas MD Last physical: Visit date not found Last MTM visit: Visit date not found     Next appt within 3 mo: Visit date not found  Next physical within 3 mo: Visit date not found  Prescriber OR PCP: Darrius Lagunas MD  Last diagnosis associated with med order: 1. Diabetes (H)  - NOVOLOG FLEXPEN U-100 INSULIN 100 unit/mL (3 mL) injection pen [Pharmacy Med Name: NOVOLOG 100 UNIT/ML FLEXPEN]; INJECT 12 UNITS UNDER THE SKIN THREE TIMES DAILY BEFORE MEALS; Refill: 3    If protocol passes may refill for 6 months if within 3 months of last provider visit (or a total of 9 months).              Passed - A1C in last 6 months     Hemoglobin A1c   Date Value Ref Range Status   06/17/2019 7.4 (H) 3.5 - 6.0 % Final               Passed - Microalbumin in last year     Microalbumin, Random Urine   Date Value Ref Range Status   06/17/2019 <0.50 0.00 - 1.99 mg/dL Final                  Passed - Blood pressure in last year     BP Readings from Last 1 Encounters:   06/17/19 120/82             Passed - Creatinine done in last year     Creatinine   Date Value Ref Range Status   08/29/2018 0.88 0.70 - 1.30 mg/dL Final

## 2021-05-30 NOTE — PROGRESS NOTES
Preoperative Exam    Scheduled Procedure: Right Vats, Poss Limited Thoracotomy Wedge Resection, Right Upper Lung Nodules  Surgery Date:  8/1/19  Surgery Location: [facility] Fax: [phone]  Surgeon:  Dr. Giraldo    Assessment/Plan:     Satisfactory medical examination to proceed with right sided video-assisted thoracoscopic surgery and anticipated wedge resection for right lung nodules believed to be metastases of colon cancer.  Medically has been very stable.  Not experiencing any cardiac or pulmonary symptoms.    Hemoglobin 15.6 measured July 11, 2019  Creatinine 0.9 measured June 17, 2019  EKG taken today, read by me, is normal.    Diabetes has been reasonably controlled.  His regimen has been 46 units of Basaglar are daily, which he takes in the evening.  I asked him to cut that dose in half the evening before surgery, therefore take 23 units.  The morning of surgery he will be n.p.o.  Obviously, do not take any short acting insulin, until you are eating again.    He should take his Keppra with a small sip of water.    Regarding history of pulmonary emboli in 2010 (for which she is been on long-term warfarin), he took his last evening dose of warfarin on Friday, July 26.   Therefore he had NO warfarin on Saturday 27th, Sunday 28th, Monday 29th, and he will take no warfarin tonight Tuesday 30th, no warfarin Wednesday the 31st. He will have surgery Thursday, August 1.  The plan that he is discussed with his surgeon and medical oncologist is that he will start Lovenox soon postoperatively and also restart oral warfarin, and stay on Lovenox until his INR is therapeutic at 2-3.      Surgical Procedure Risk: Low (reported cardiac risk generally < 1%)  Have you had prior anesthesia?: Yes  Have you or any family members had a previous anesthesia reaction:  No  Do you or any family members have a history of a clotting or bleeding disorder?: Yes: Bilateral blood clots 7-8 years ago  Cardiac Risk Assessment: no  increased risk for major cardiac complications    APPROVAL GIVEN to proceed with proposed procedure, without further diagnostic evaluation    Functional Status: Independent  Patient plans to recover at home with family.     Subjective:      Hero Harris is a 59 y.o. male retired  who presents for a preoperative consultation.    All other systems reviewed and are negative, other than those listed in the HPI.  Hemoglobin 15.6 measured July 11, 2019  Creatinine 0.9 measured June 17, 2019 6-17-19 CT  now clearly a nodule with some adjacent atelectasis. The nodule on image 90 of series 3 measures 2.4 x 1.3  CONCLUSION:  1.  Right lower lobe pulmonary nodule likely represents a metastasis. This is not readily amenable to percutaneous biopsy given location adjacent to the diaphragm.     [facility]  Surgeon Eugene Giraldo MD  Had 2 lung resection 2016 by Dr Giraldo, no anaesthesia complications.    He has radiation therapist to (he recalls) near maximum doses to lung tissue.  But he feels his exercise tolerance pretty good.  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 In 2016 he had bridging.    He has discussed anticoagulation with surgeon Dr Giraldo and med oncologist Dr Amirah Gipson at [facility]  Told he could stop warfarin, he took last dose Saturday July 27.  Plan is that he will be started on Lovenox post-op when Dr Giraldo approves.    INR is managed at [facility]  He has a port in left subclavian vein    Seizure disorder  On Keppra after seizure few year ago--single incident, there is been no recurrence.  He does drive    Type 2 diabetes  He told me he takes 46 basaglar.  He is currently taking 46 units of Basaglar along with 12 to 16 units of NovoLog with every meal  I told him to talk half dose evening before surgery.    #1.  Type 2  "DM  insulin aspart U-100 (NOVOLOG FLEXPEN U-100 INSULIN) 100 unit/mL (3 mL) injection pen INJECT 12 UNITS UNDER THE SKIN THREE TIMES DAILY BEFORE MEALS     BASAGLAR KWIKPEN U-100 INSULIN 100 unit/mL (3 mL) pen INJECT 65 UNITS UNDER THE SKIN AT BEDTIME. (Patient taking differently: INJECT 46 UNITS UNDER THE SKIN AT BEDTIME.)     Lab Results   Component Value Date    HGBA1C 7.4 (H) 06/17/2019     No cardiac history.    Hyperlipidemia.  Last LDL was 126 in August 2018  Not yet started simvastatin.     Pertinent History  Do you have difficulty breathing or chest pain after walking up a flight of stairs: No  History of obstructive sleep apnea: No  Steroid use in the last 6 months: No  Frequent Aspirin/NSAID use: No, Takes warfarin  Prior Blood Transfusion: Yes, unsure when  Prior Blood Transfusion Reaction: No  If for some reason prior to, during or after the procedure, if it is medically indicated, would you be willing to have a blood transfusion?:  There is no transfusion refusal.       Current Outpatient Medications   Medication Sig Dispense Refill     BASAGLAR KWIKPEN U-100 INSULIN 100 unit/mL (3 mL) pen INJECT 65 UNITS UNDER THE SKIN AT BEDTIME. (Patient taking differently: INJECT 46 UNITS UNDER THE SKIN AT BEDTIME.) 15 adj dose pen 9     blood glucose test (ACCU-CHEK SMARTVIEW TEST STRIP) strips Use 1 each As Directed as needed. 100 strip 3     insulin aspart U-100 (NOVOLOG FLEXPEN U-100 INSULIN) 100 unit/mL (3 mL) injection pen INJECT 12 UNITS UNDER THE SKIN THREE TIMES DAILY BEFORE MEALS 3 mL 3     levETIRAcetam (KEPPRA) 750 MG tablet Take 750 mg by mouth 2 (two) times a day.       pen needle, diabetic (BD ULTRA-FINE SHORT PEN NEEDLE) 31 gauge x 5/16\" Ndle USE AS DIRECTED FOR INSULIN INJECTIONS 4X DAILY 400 each 2     simvastatin (ZOCOR) 20 MG tablet Take 1 tablet (20 mg total) by mouth every evening. 30 tablet 11     warfarin (COUMADIN) 5 MG tablet Take 5-7.5 mg by mouth See Admin Instructions. 5 mg Sat & Sun, " 7.5mg all other days       No current facility-administered medications for this visit.         Allergies   Allergen Reactions     Perfume      House Dust Other (See Comments)     Rhinitis     Perfume Ht52 Other (See Comments)     Runny nose       Patient Active Problem List   Diagnosis     Idiopathic Intracranial Hypertension     Rectal cancer (H)     Type 2 diabetes mellitus (H)     Seizure disorder (H)     Pulmonary emboli (H)     Liver metastasis (H)     Metastasis to lung (H)     Morbid obesity (H)       No past medical history on file.    Past Surgical History:   Procedure Laterality Date     ABDOMINOPERINEAL PROCTOCOLECTOMY       COLOSTOMY       LIVER RESECTION       NH PELVIC EXENTERATION COMPLETE, OTHER MALIG      Description: Abdominoperineal Resection;  Recorded: 05/10/2012;       Social History     Socioeconomic History     Marital status:      Spouse name: Not on file     Number of children: Not on file     Years of education: Not on file     Highest education level: Not on file   Occupational History     Not on file   Social Needs     Financial resource strain: Not on file     Food insecurity:     Worry: Not on file     Inability: Not on file     Transportation needs:     Medical: Not on file     Non-medical: Not on file   Tobacco Use     Smoking status: Never Smoker     Smokeless tobacco: Never Used   Substance and Sexual Activity     Alcohol use: Yes     Comment: occasional     Drug use: No     Sexual activity: Not on file   Lifestyle     Physical activity:     Days per week: Not on file     Minutes per session: Not on file     Stress: Not on file   Relationships     Social connections:     Talks on phone: Not on file     Gets together: Not on file     Attends Muslim service: Not on file     Active member of club or organization: Not on file     Attends meetings of clubs or organizations: Not on file     Relationship status: Not on file     Intimate partner violence:     Fear of current or  ex partner: Not on file     Emotionally abused: Not on file     Physically abused: Not on file     Forced sexual activity: Not on file   Other Topics Concern     Not on file   Social History Narrative    11/16/2015 - The patient is a  by profession.          Objective:     /88 (Patient Site: Right Arm, Patient Position: Sitting, Cuff Size: Adult Large)   Pulse 72   Temp 98.5  F (36.9  C) (Oral)   Wt (!) 268 lb 4.8 oz (121.7 kg)   SpO2 96%   BMI 36.90 kg/m        Physical Exam:  Very pleasant, appears comfortable, no distress, moderately overweight  Oropharynx clear, good dentition  Neck with normal range of motion  Lungs clear  Heart regular rate and rhythm, no murmur  Extremities no edema  Venous access port palpable in the left upper chest wall  Abdomen nontender.  Colostomy present in left lower quadrant    There are no Patient Instructions on file for this visit.    EKG: Normal sinus rhythm 59 bpm.  Normal EKG.    Labs:    Hemoglobin 15.6 measured July 11, 2019  Creatinine 0.9 measured June 17, 2019    Immunization History   Administered Date(s) Administered     DT (pediatric) 01/01/1997     Influenza, inj, historic,unspecified 10/20/2015, 10/30/2017     Influenza, seasonal,quad inj 36+ mos 10/20/2015, 09/22/2016     Pneumo Polysac 23-V 05/16/2012     Td,adult,historic,unspecified 01/01/1997     Tdap 09/14/2010       Electronically signed by David Ozuna MD 07/30/19 9:27 AM

## 2021-05-30 NOTE — PATIENT INSTRUCTIONS - HE
Satisfactory medical examination to proceed with right video-assisted thoracoscopic surgery and anticipated wedge resection for right lung nodules believed to be metastases of colon cancer.  Medically has been very stable.  Not experiencing any cardiac or pulmonary symptoms.  Diabetes has been reasonably controlled.    His regimen has been 46 units of Basaglar are daily, which he takes in the evening.  I asked him to cut that dose in half the evening before surgery, therefore take 23 units.    The morning of surgery he will be n.p.o.  Obviously, do not take any short acting insulin, until you are eating again.    He should take his Keppra with a small sip of water.    Regarding history of pulmonary emboli in 2010 (for which she is been on long-term warfarin), he took his last evening dose of warfarin on Friday, July 26.  Therefore he had no warfarin on Saturday the 27th, Sunday 28, Monday 29, and he will take no warfarin tonight Tuesday the 30th, no warfarin Wednesday the 31st, and he will have surgery Thursday, August 1.  The plan that he is discussed with his surgeon and medical oncologist is that he will start Lovenox soon postoperatively and also restart oral warfarin, and stay on Lovenox until his INR is therapeutic at 2-3.

## 2021-05-30 NOTE — PROGRESS NOTES
RESPIRATORY CARE NOTE     Patient Name: Hero Harris  Today's Date: 7/11/2019     Complete PFT done. Pt performed tests with good effort. Albuterol neb given.Test results meet ATS criteria. Results scanned into epic. Pt left in no distress.       Treva Mata, LRT    RESPIRATORY CARE NOTE     Patient Name: Hero Harris  Today's Date: 7/11/2019     Problem List  Patient Active Problem List   Diagnosis     Idiopathic Intracranial Hypertension     Rectal cancer (H)     Type 2 diabetes mellitus (H)     Seizure disorder (H)     Pulmonary emboli (H)     Liver metastasis (H)     Metastasis to lung (H)     Morbid obesity (H)                           Treva Mata LRT

## 2021-05-30 NOTE — TELEPHONE ENCOUNTER
New Appointment Needed  What is the reason for the visit:    Pre-Op Appt Request  When is the surgery? :  08.01.19  Where is the surgery?:   [facility]  Who is the surgeon? :  Dr. Giraldo  What type of surgery is being done?: lung resection  Provider Preference: Any available  How soon do you need to be seen?: per patient he's available to see any provider who has openings.  He is scheduled with Dr. Ozuna on 07/30 at 9 am per his request. Is this okay with Dr. Lagunas?  Waitlist offered?: No  Okay to leave a detailed message:  Yes

## 2021-05-31 VITALS — BODY MASS INDEX: 37.83 KG/M2 | WEIGHT: 270.2 LBS | HEIGHT: 71 IN

## 2021-05-31 NOTE — TELEPHONE ENCOUNTER
Refill Approved    Rx renewed per Medication Renewal Policy. Medication was last renewed on 7/7/19.    Luli Braun, [facility] Connection Triage/Med Refill 8/8/2019     Requested Prescriptions   Pending Prescriptions Disp Refills     insulin aspart U-100 (NOVOLOG FLEXPEN U-100 INSULIN) 100 unit/mL (3 mL) injection pen [Pharmacy Med Name: NOVOLOG 100 UNIT/ML FLEXPEN]  0     Sig: INJECT 12 UNITS UNDER THE SKIN THREE TIMES DAILY BEFORE MEALS       Insulin/GLP-1 Refill Protocol Passed - 8/8/2019  1:35 AM        Passed - Visit with PCP or prescribing provider visit in last 6 months     Last office visit with prescriber/PCP: 6/17/2019 OR same dept: 6/17/2019 Darrius Lagunas MD OR same specialty: 6/17/2019 Darrius Lagunas MD Last physical: Visit date not found Last MTM visit: Visit date not found     Next appt within 3 mo: Visit date not found  Next physical within 3 mo: Visit date not found  Prescriber OR PCP: Darrius Lagunas MD  Last diagnosis associated with med order: 1. Diabetes (H)  - NOVOLOG FLEXPEN U-100 INSULIN 100 unit/mL (3 mL) injection pen [Pharmacy Med Name: NOVOLOG 100 UNIT/ML FLEXPEN]; INJECT 12 UNITS UNDER THE SKIN THREE TIMES DAILY BEFORE MEALS; Refill: 0    If protocol passes may refill for 6 months if within 3 months of last provider visit (or a total of 9 months).              Passed - A1C in last 6 months     Hemoglobin A1c   Date Value Ref Range Status   06/17/2019 7.4 (H) 3.5 - 6.0 % Final               Passed - Microalbumin in last year     Microalbumin, Random Urine   Date Value Ref Range Status   06/17/2019 <0.50 0.00 - 1.99 mg/dL Final                  Passed - Blood pressure in last year     BP Readings from Last 1 Encounters:   07/30/19 128/88             Passed - Creatinine done in last year     Creatinine   Date Value Ref Range Status   08/29/2018 0.88 0.70 - 1.30 mg/dL Final

## 2021-06-02 ENCOUNTER — RECORDS - HEALTHEAST (OUTPATIENT)
Dept: ADMINISTRATIVE | Facility: CLINIC | Age: 61
End: 2021-06-02

## 2021-06-02 VITALS — HEIGHT: 72 IN | BODY MASS INDEX: 36.84 KG/M2 | WEIGHT: 272 LBS

## 2021-06-02 NOTE — TELEPHONE ENCOUNTER
"Refill Approved    Rx renewed per Medication Renewal Policy. Medication was last renewed on 8/29/18.    Monica Ovalles, [facility] Triage/Med Refill 10/11/2019     Requested Prescriptions   Pending Prescriptions Disp Refills     pen needle, diabetic (BD ULTRA-FINE SHORT PEN NEEDLE) 31 gauge x 5/16\" Ndle 400 each 2     Sig: USE AS DIRECTED FOR INSULIN INJECTIONS 4X DAILY       Diabetic Supplies Refill Protocol Passed - 10/10/2019  3:54 PM        Passed - Visit with PCP or prescribing provider visit in last 6 months     Last office visit with prescriber/PCP: 6/17/2019 Darrius Lagunas MD OR same dept: 6/17/2019 Darrius Lagunas MD OR same specialty: 6/17/2019 Darrius Lagunas MD  Last physical: Visit date not found Last MTM visit: Visit date not found   Next visit within 3 mo: Visit date not found  Next physical within 3 mo: Visit date not found  Prescriber OR PCP: Darrius Lagunas MD  Last diagnosis associated with med order: 1. Diabetes mellitus (H)  - pen needle, diabetic (BD ULTRA-FINE SHORT PEN NEEDLE) 31 gauge x 5/16\" Ndle; USE AS DIRECTED FOR INSULIN INJECTIONS 4X DAILY  Dispense: 400 each; Refill: 2    If protocol passes may refill for 12 months if within 3 months of last provider visit (or a total of 15 months).             Passed - A1C in last 6 months     Hemoglobin A1c   Date Value Ref Range Status   06/17/2019 7.4 (H) 3.5 - 6.0 % Final                           "

## 2021-06-02 NOTE — TELEPHONE ENCOUNTER
Left VM with [facility] medical to refax form to direct fax line  ADORE Ferreira  3:39 PM ........ 10/25/2019

## 2021-06-02 NOTE — TELEPHONE ENCOUNTER
Who is calling:  Amirah with [facility], [phone]  Reason for Call:    Amirah is questioning if clinic has received the order for Ostomy supplies for patient.  She says she faxed the order on 10/10/19.  Provider who ordered supplies is Dr Espinal who patient saw on 4/4/19 at [facility].  Please reach out to Amirah and advise.  Date of last appointment with primary care: 7/30/19  Okay to leave a detailed message: Yes

## 2021-06-03 ENCOUNTER — RECORDS - HEALTHEAST (OUTPATIENT)
Dept: ADMINISTRATIVE | Facility: CLINIC | Age: 61
End: 2021-06-03

## 2021-06-03 VITALS — BODY MASS INDEX: 37.13 KG/M2 | WEIGHT: 270 LBS

## 2021-06-03 VITALS — BODY MASS INDEX: 36.9 KG/M2 | WEIGHT: 268.3 LBS

## 2021-06-04 VITALS
SYSTOLIC BLOOD PRESSURE: 137 MMHG | BODY MASS INDEX: 36.58 KG/M2 | WEIGHT: 266 LBS | HEART RATE: 69 BPM | DIASTOLIC BLOOD PRESSURE: 69 MMHG | OXYGEN SATURATION: 97 %

## 2021-06-05 ENCOUNTER — RECORDS - HEALTHEAST (OUTPATIENT)
Dept: SCHEDULING | Facility: CLINIC | Age: 61
End: 2021-06-05

## 2021-06-05 VITALS
WEIGHT: 265 LBS | BODY MASS INDEX: 36.44 KG/M2 | DIASTOLIC BLOOD PRESSURE: 84 MMHG | SYSTOLIC BLOOD PRESSURE: 118 MMHG | HEART RATE: 72 BPM

## 2021-06-05 DIAGNOSIS — C18.9 MALIGNANT NEOPLASM OF COLON (H): ICD-10-CM

## 2021-06-05 NOTE — TELEPHONE ENCOUNTER
Medication Question or Clarification  Who is calling: Patient  What medication are you calling about (include dose and sig)?:   BASAGLAR KWIKPEN U-100 INSULIN 100 unit/mL (3 mL) pen 15 adj dose pen 9 4/4/2019     Sig: INJECT 65 UNITS UNDER THE SKIN AT BEDTIME.    Patient taking differently: INJECT 46 UNITS UNDER THE SKIN AT BEDTIME.        Sent to pharmacy as: BASAGLAR KWIKPEN U-100 INSULIN 100 unit/mL (3 mL) pen    E-Prescribing Status: Receipt confirmed by pharmacy (4/4/2019  4:23 PM CDT)      insulin aspart U-100 (NOVOLOG FLEXPEN U-100 INSULIN) 100 unit/mL (3 mL) injection pen 12 Pre-filled Pen Syringe 1 8/8/2019     Sig: INJECT 12 UNITS UNDER THE SKIN THREE TIMES DAILY BEFORE MEALS    Sent to pharmacy as: insulin aspart U-100 (NOVOLOG FLEXPEN U-100 INSULIN) 100 unit/mL (3 mL) injection pen    E-Prescribing Status: Receipt confirmed by pharmacy (8/8/2019  6:29 PM CDT)        Who prescribed the medication?:   Darrius Lagunas MD    What is your question/concern?:   Patient's insurance company is no longer covering above medications.    Patient's insurance company will cover:  Lantus  Humalog    Please send new scripts for Lantus and Humalog to patient's Pharmacy.  Thank you.    Requested Pharmacy: [facility] #[id number]    Okay to leave a detailed message?: Yes

## 2021-06-06 ENCOUNTER — COMMUNICATION - HEALTHEAST (OUTPATIENT)
Dept: INTERNAL MEDICINE | Facility: CLINIC | Age: 61
End: 2021-06-06

## 2021-06-06 DIAGNOSIS — Z79.4 TYPE 2 DIABETES MELLITUS WITHOUT COMPLICATION, WITH LONG-TERM CURRENT USE OF INSULIN (H): ICD-10-CM

## 2021-06-06 DIAGNOSIS — E11.9 TYPE 2 DIABETES MELLITUS WITHOUT COMPLICATION, WITH LONG-TERM CURRENT USE OF INSULIN (H): ICD-10-CM

## 2021-06-07 RX ORDER — INSULIN GLARGINE 100 [IU]/ML
INJECTION, SOLUTION SUBCUTANEOUS
Qty: 15 ML | Refills: 3 | Status: SHIPPED | OUTPATIENT
Start: 2021-06-07 | End: 2021-10-12

## 2021-06-09 NOTE — PROGRESS NOTES
Preoperative Exam    Scheduled Procedure: cerebral angiogram  Surgery Date:  7/30/20  Surgery Location: [facility]    Surgeon:  Dr Gutierrez    Assessment/Plan:     1. Preoperative examination  No contraindications for planned procedure.    2. Seizure disorder (H)  He plans on having a cerebral angiogram as a part of his routine monitoring of a chronic seizure disorder that has been relatively well controlled over the last few years.  He is on Keppra.    3. Type 2 diabetes mellitus without complication, with long-term current use of insulin (H)  Hemoglobin A1c from a year ago was 7.2.  He does not routinely check his blood sugars.  We will recheck a hemoglobin A1c today.  He will reduce his Lantus down to 23 units the evening before his procedure.  No short acting insulin the day of.    4. Malignant neoplasm metastatic to lung, unspecified laterality (H)  He has had 2 lung wedge resections due to metastatic colon cancer and now has a chronic colostomy bag.  He feels good today in terms of his breathing    5. Morbid obesity (H)  He has never had a sleep study.  He does not think he has sleep apnea.        Surgical Procedure Risk: Intermediate (reported cardiac risk generally 1-5%)  Have you had prior anesthesia?: Yes  Have you or any family members had a previous anesthesia reaction:  No  Do you or any family members have a history of a clotting or bleeding disorder?: No  Cardiac Risk Assessment: increased risk for major cardiac complications based on  diabetes mellitus requiring use of insulin    Pending review of diagnostic evaluation, APPROVAL GIVEN to proceed with proposed procedure, without further diagnostic evaluation.    Please Note:  Patient has an implanted device.  Device Type: Inez cath      Device Vendor:  NA    Functional Status: Independent  Patient plans to recover at home with family.     Subjective:      Hero Harris is a 60 y.o. male who presents for a preoperative consultation.   "    Please see above.  Patient otherwise feeling well today and has no acute complaints.      All other systems reviewed and are negative, other than those listed in the HPI.    Pertinent History  Do you have difficulty breathing or chest pain after walking up a flight of stairs: No  History of obstructive sleep apnea: No  Steroid use in the last 6 months: No  Frequent Aspirin/NSAID use: No  Prior Blood Transfusion: No  Prior Blood Transfusion Reaction: No  If for some reason prior to, during or after the procedure, if it is medically indicated, would you be willing to have a blood transfusion?:  There is no transfusion refusal.    Current Outpatient Medications   Medication Sig Dispense Refill     BASAGLAR KWIKPEN U-100 INSULIN 100 unit/mL (3 mL) pen INJECT 65 UNITS UNDER THE SKIN AT BEDTIME. (Patient taking differently: INJECT 46 UNITS UNDER THE SKIN AT BEDTIME.) 15 adj dose pen 9     insulin glargine (LANTUS SOLOSTAR U-100 INSULIN) 100 unit/mL (3 mL) pen Inject 65 Units under the skin daily. Do not mix Lantus with any other insulin (Patient taking differently: Inject 46 Units under the skin daily. Do not mix Lantus with any other insulin) 20 adj dose pen 3     insulin lispro (HUMALOG KWIKPEN INSULIN) 100 unit/mL pen Inject 12 Units under the skin 3 times daily before meal. 12 adj dose pen 3     levETIRAcetam (KEPPRA) 750 MG tablet Take 750 mg by mouth 2 (two) times a day.       warfarin (COUMADIN) 5 MG tablet Take 5-7.5 mg by mouth See Admin Instructions. 5 mg Sat & Sun, 7.5mg all other days       blood glucose test (ACCU-CHEK SMARTVIEW TEST STRIP) strips Use 1 each As Directed as needed. 100 strip 3     insulin aspart U-100 (NOVOLOG FLEXPEN U-100 INSULIN) 100 unit/mL (3 mL) injection pen INJECT 12 UNITS UNDER THE SKIN THREE TIMES DAILY BEFORE MEALS 12 Pre-filled Pen Syringe 1     pen needle, diabetic (BD ULTRA-FINE SHORT PEN NEEDLE) 31 gauge x 5/16\" Ndle USE AS DIRECTED FOR INSULIN INJECTIONS 4X DAILY 400 each 3 "     simvastatin (ZOCOR) 20 MG tablet Take 1 tablet (20 mg total) by mouth every evening. 30 tablet 11     No current facility-administered medications for this visit.         Allergies   Allergen Reactions     Perfume      House Dust Other (See Comments)     Rhinitis     Perfume Ht52 Other (See Comments)     Runny nose       Patient Active Problem List   Diagnosis     Idiopathic Intracranial Hypertension     Rectal cancer (H)     Type 2 diabetes mellitus (H)     Seizure disorder (H)     Pulmonary emboli (H)     Liver metastasis (H)     Metastasis to lung (H)     Morbid obesity (H)       No past medical history on file.    Past Surgical History:   Procedure Laterality Date     ABDOMINOPERINEAL PROCTOCOLECTOMY       COLOSTOMY       LIVER RESECTION       VT PELVIC EXENTERATION COMPLETE, OTHER MALIG      Description: Abdominoperineal Resection;  Recorded: 05/10/2012;       Social History     Socioeconomic History     Marital status:      Spouse name: Not on file     Number of children: Not on file     Years of education: Not on file     Highest education level: Not on file   Occupational History     Not on file   Social Needs     Financial resource strain: Not on file     Food insecurity     Worry: Not on file     Inability: Not on file     Transportation needs     Medical: Not on file     Non-medical: Not on file   Tobacco Use     Smoking status: Never Smoker     Smokeless tobacco: Never Used   Substance and Sexual Activity     Alcohol use: Yes     Comment: occasional     Drug use: No     Sexual activity: Not on file   Lifestyle     Physical activity     Days per week: Not on file     Minutes per session: Not on file     Stress: Not on file   Relationships     Social connections     Talks on phone: Not on file     Gets together: Not on file     Attends Zoroastrian service: Not on file     Active member of club or organization: Not on file     Attends meetings of clubs or organizations: Not on file     Relationship  status: Not on file     Intimate partner violence     Fear of current or ex partner: Not on file     Emotionally abused: Not on file     Physically abused: Not on file     Forced sexual activity: Not on file   Other Topics Concern     Not on file   Social History Narrative    11/16/2015 - The patient is a  by profession.       Patient Care Team:  Darrius Lagunas MD as PCP - General (Internal Medicine)  David Ozuna MD as Assigned PCP          Objective:     Vitals:    07/20/20 0854   BP: 137/69   Pulse: 69   SpO2: 97%   Weight: (!) 266 lb (120.7 kg)         Physical Exam:  General: No apparent distress. Calm. Alert and Oriented X3. Pt behavior is appropriate.  Head:Atraumatic. Normocephalic, non-tender to palpation  Neck: Supple. No JVD. Full ROM.   Eyes: PERRL, No discharge. No strabismus. No nystagmus.  Ears: TMs pearly gray with landmarks visible.   Nose/Mouth/Throat: Patent nares, no oral lesions, pharynx clear and without exudate. Uvula mid-line. Nasal septum mid-line. Clear turbinates.   Lymph: No axillar or cervical adenopathy.   Chest/Lungs: Normal chest wall, clear to auscultation, normal respiratory effort and rate.   Heart/Pulses: Regular rate and rhythm, strong and equal radial pulses, no murmurs. Capillary refill <2 seconds. No edema.   Abdomen: Obese.  Colostomy in place in left lower quadrant.  Soft, no palpable masses. No hepatosplenomegaly, no tenderness with palpation noted. Bowel sounds active in all quadrants. No increased tympany.   Genitalia: Not examined.   Musculoskeletal: No CVA tenderness with palpation. Good ROM with extremities.   Neurologic: Interactive, alert, no focal findings, CNs intact.   Skin: Warm, dry. Normal hair pattern. Free of lesions. Normal skin turgor.         Patient Instructions   Reduce Lantus insulin to 23 units the evening before your procedure.    No short acting insulin on the morning of your procedure.    Stop warfarin 4 days prior to your  procedure.  Resume day of.    Schedule a virtual visit with PCP to discuss statin    Hold all supplements, aspirin and NSAIDs for 7 days prior to surgery.    Follow your surgeon's direction on when to stop eating and drinking prior to surgery.    Your surgeon will be managing your pain after your surgery.      Remove all jewelry and metal piercings before your surgery.     Remove nail polish from fingers before surgery.      EKG:    Most Recent EKG     Units 07/20/20  0903   VENTRATE BPM 63   ATRIALRATE BPM 63   QRSDURATION ms 98   QTINTERVAL ms 420   QTCCALC ms 429   P Axis degrees 38   RAXIS degrees 17   TAXIS degrees 43   MUSEDX  Normal sinus rhythm  Normal ECG  When compared with ECG of 30-JUL-2019 10:16,  No significant change was found           Labs:  Labs pending at this time.  Results will be reviewed when available.    Immunization History   Administered Date(s) Administered     DT (pediatric) 01/01/1997     Influenza, inj, historic,unspecified 10/20/2015, 10/30/2017     Influenza,seasonal,quad inj =/> 6months 10/20/2015, 09/22/2016     Pneumo Polysac 23-V 05/16/2012     Td,adult,historic,unspecified 01/01/1997     Tdap 09/14/2010           Electronically signed by Santana Blackwood CNP 07/20/20 8:57 AM

## 2021-06-09 NOTE — PATIENT INSTRUCTIONS - HE
Reduce Lantus insulin to 23 units the evening before your procedure.    No short acting insulin on the morning of your procedure.    Stop warfarin 4 days prior to your procedure.  Resume day of.    Schedule a virtual visit with PCP to discuss statin    Hold all supplements, aspirin and NSAIDs for 7 days prior to surgery.    Follow your surgeon's direction on when to stop eating and drinking prior to surgery.    Your surgeon will be managing your pain after your surgery.      Remove all jewelry and metal piercings before your surgery.     Remove nail polish from fingers before surgery.

## 2021-06-12 ENCOUNTER — HEALTH MAINTENANCE LETTER (OUTPATIENT)
Age: 61
End: 2021-06-12

## 2021-06-13 NOTE — PROGRESS NOTES
RESPIRATORY CARE NOTE     Patient Name: Hero Harris  Today's Date: 9/26/2017     Complete PFT done. Pt performed tests with good effort. Albuterol neb given.Test results meet ATS criteria. Results scanned into epic. Pt left in no distress.           Problem List  Patient Active Problem List   Diagnosis     Idiopathic Intracranial Hypertension     Supraventricular Tachycardia     Rectal Cancer- dx 2010     Type 2 diabetes mellitus     Colon cancer     Thrombophlebitis                           Treva Mata LRT

## 2021-06-14 NOTE — PROGRESS NOTES
ASSESSMENT/PLAN:  1. Type 2 diabetes mellitus  Poorly controlled and he does not follow up very often.  We discussed this in detail and the fact that with his colon cancer he has always question what the purpose or reason is.  He understands it still important that there are reasons to be taken care of his diabetes.  I am happy to help in any way possible.  Please see the recommendations that we made below and the changes made.  This was a complex decision making process.  - Glycosylated Hemoglobin A1c  - JIC RED  - Microalbumin, Random Urine    2. Thrombophlebitis  Is on Coumadin.  He will continue same.    3. Rectal Cancer- dx 2010  This is the underlying cancer and metastases.    4. Seizure disorder  This was not in the record before but apparently he did have a seizure years ago relating to a clot rather than metastasis.  He remains on Keppra.  Is not sure if this is affecting his memory.    5.  Memory changes- we will check a Lyme titer.  Otherwise labs have been okay.  Will discuss when he comes back in.        Patient Instructions   One day, check blood sugar in the morning and before dinner. The next day, check at lunch and before bed. We would like to always see your blood sugar under 180.     Increase Lantus to 38 units once daily and Novolog to 15-20 units with meals. Use less for a small breakfast.     Email your blood sugars to us  before Thursday. I can then make any appropriate changes.     See your eye doctor yearly to catch any retina changes before they affect your vision.     Ask about Shingrix. (New shingles vaccine)        Return in about 4 months (around 4/15/2018) for diabetes.    CHIEF COMPLAINT:  Chief Complaint   Patient presents with     Diabetes       HISTORY OF PRESENT ILLNESS:  Hero Harris is a 57 y.o. male presenting to the clinic today for a diabetic check.     Diabetes: His last hemoglobin A1c was 8.7% on 12/19/2016. His hemoglobin A1c is 9.0% today. He is using 30 units of  "Lantus daily and Novolog 12-14 units with meals depending on what he eats. He does not usually eat breakfast, so he does not generally give himself a morning dose. His blood sugar was 140 this morning; he ate a large dinner last night. He has not been good about checking his blood sugar regularly. He knows that there is room for improvement with his diet and portion control. He has never had a hypoglycemic episode, but he thinks he can feel it when his blood sugar starts to go down. He would like to get better control of his diabetes, but he is concerned about increasing his dose of insulin.     Rectal Cancer: He follows with [facility] for his metastatic colon cancer. He received Trilogy based radiosurgery for lung lesions in October. He was getting dexamethasone before those treatments. The plan is to repeat CT of the chest in January. He feels okay considering his condition.     Seizure: He had a seizure about five years ago. His son had to perform CPR until he started breathing again. There was not a tumor in the brain, but he states it was a blood clot. He continues on warfarin and Keppra.     Health Maintenance: He got a flu shot last month. He has not had the shingles vaccine.     REVIEW OF SYSTEMS:   His weight has been fairly stable. He has not been getting a yearly eye exam, but he will make an appointment. He has had numbness in his hands and feet for years. He has toenail fungus. He denies abnormal chest pain, pressure, or shortness of breath. His bladder has been fine. He has had some trouble with his short term memory and inquires if that could be from Keppra. Comprehensive review of systems negative except as noted above.    PFSH:  He stays active in the summer and likes fishing.     TOBACCO USE:  History   Smoking Status     Never Smoker   Smokeless Tobacco     Never Used       VITALS:  Vitals:    12/15/17 1306   BP: 122/76   Pulse: 63   Weight: (!) 270 lb 3.2 oz (122.6 kg)   Height: 5' 11.5\" (1.816 " m)     Wt Readings from Last 3 Encounters:   12/15/17 (!) 270 lb 3.2 oz (122.6 kg)   12/19/16 (!) 273 lb 6.4 oz (124 kg)   09/22/16 (!) 272 lb 1.6 oz (123.4 kg)     Body mass index is 37.16 kg/(m^2).    The following high BMI interventions were performed this visit: weight monitoring    PHYSICAL EXAM:  General Appearance: Alert, cooperative, no distress, appears stated age.  Lungs: Clear to auscultation bilaterally, good air movement.  Heart: Regular rate and rhythm, S1 and S2 normal, no murmur or bruit.  Extremities: No CCE, pulses II/IV and symmetric,   Psychiatric: he has a normal mood and affect.   Diabetic Foot Exam: Although he complained of numbness and tingling, vibratory sense was intact    Notes Reviewed, additional history from source other than patient (2 TOTAL): Reviewed 10/10/2017 [facility] note regarding metastatic colon cancer. Reviewed 9/2017 oncology note.     Accessed Care Everywhere, Requested Records, Consult with Physician (1 TOTAL): None.     Radiology tests summarized or ordered (XR, CT, MRI, DXA, US): None.    Labs reviewed or ordered (1 TOTAL): Reviewed 9/22/16 CMP, 12/19/16 A1c, 8/14/17 creatinine. Labs in August reviewed - CBC normal, hemoglobin 15.7, platelets normal, creatinine and electrolytes good. LFT's normal. Labs ordered.     Medicine tests reviewed or ordered (ECG, echocardiogram, colonoscopy, EGD, venous US) (1 TOTAL): None.    Independent review of ECG or XR (2 EACH): None.      The visit lasted a total of 22 minutes face to face with the patient. Over 50% of the time was spent counseling and educating the patient about his diabetes.    I, Kar Astorga, am scribing for and in the presence of, Dr. Espinal.    I, Dr. Hernandez, personally performed the services described in this documentation, as scribed by Kra Astorga in my presence, and it is both accurate and complete.    MEDICATIONS:  Current Outpatient Prescriptions   Medication Sig Dispense Refill     blood glucose  "test (ACCU-CHEK SMARTVIEW TEST STRIP) strips Use 1 each As Directed as needed. 100 strip 3     insulin aspart (NOVOLOG FLEXPEN) 100 unit/mL injection pen Inject 12 Units under the skin 3 (three) times a day before meals. 30 mL 0     insulin needles, disposable, (BD INSULIN PEN NEEDLE UF SHORT) 31 X 5/16 \" Ndle USE AS DIRECTED FOR INSULIN INJECTIONS 4X DAILY 400 each 2     LANTUS SOLOSTAR 100 unit/mL (3 mL) pen Inject 35 Units under the skin bedtime. 11 adj dose pen 3     levETIRAcetam (KEPPRA) 750 MG tablet Take 750 mg by mouth 2 (two) times a day.       warfarin (COUMADIN) 5 MG tablet Take 5-7.5 mg by mouth See Admin Instructions. 5 mg Sat & Sun, 7.5mg all other days       No current facility-administered medications for this visit.        Total data points: 3    "

## 2021-06-15 NOTE — PROGRESS NOTES
Assessment & Plan   Problem List Items Addressed This Visit     Type 2 diabetes mellitus (H) - Primary    Relevant Orders    Lipid Cascade    Comprehensive Metabolic Panel    Microalbumin, Random Urine    Glycosylated Hemoglobin A1c    Rectal cancer (H)    Chemotherapy-induced peripheral neuropathy (H)         #1.  Type 2 diabetes, degree of control be determined.  Check labs as above.  I will call him with results and further recommendations.  If his labs are well controlled we will plan on seeing him back in in 6 months for diabetic follow-up.    2.  History of metastatic rectal cancer, being followed every 3 months by Dr. Gipson at [facility].  This has been stable.  Continue follow-up as scheduled.    Chemotherapy-induced peripheral neuropathy, stable.  No follow-ups on file.    Darrius Lagunas MD  [facility]   Hero Harris is 60 y.o. and presents today for the following health issues     Sal comes in today for follow-up of his type 2 diabetes.  He is currently on 48 units of Lantus along with 12 units of Humalog per meal.  He states that if he has something with a little higher carbohydrate content he will increase his mealtime insulin to 13 or 14 units.  He has been checking his blood sugars over the last 6 weeks or so and states they have been ranging in the 100-1 50 range.  No hypoglycemia or need for assistance.  He has a history of metastatic rectal cancer to the lung and has undergone 2 separate wedge resections.  He sees Dr. Gipson at [facility] every 3 months with his last visit in December 2020.  At this time he was noted to be stable and was told to follow-up in 3 months.  He has chronic, longstanding chemotherapy-induced peripheral neuropathy from the oxaliplatin he took.  He states that this has been stable.          Objective    /84   Pulse 72   Wt (!) 265 lb (120.2 kg)   BMI 36.44 kg/m    Body mass index  is 36.44 kg/m .  Physical Exam    Sensation is decreased to monofilament testing in the feet bilaterally.

## 2021-06-15 NOTE — TELEPHONE ENCOUNTER
Patient is scheduled for an appointment on 3/8/21.  Gisele Bae CMA ............... 1:53 PM, 02/22/21

## 2021-06-15 NOTE — TELEPHONE ENCOUNTER
RN cannot approve Refill Request    RN can NOT refill this medication PCP messaged that patient is overdue for Office Visit. Last office visit: 6/17/2019 Darrius Lagunas MD Last Physical: Visit date not found Last MTM visit: Visit date not found Last visit same specialty: 6/17/2019 Darrius Lagunas MD.  Next visit within 3 mo: Visit date not found  Next physical within 3 mo: Visit date not found      Esthela Love, [facility] Triage/Med Refill 2/13/2021    Requested Prescriptions   Pending Prescriptions Disp Refills     LANTUS SOLOSTAR U-100 INSULIN 100 unit/mL (3 mL) pen [Pharmacy Med Name: LANTUS SOLOSTAR 100 UNIT/ML] 15 mL 3     Sig: INJECT 65 UNITS UNDER THE SKIN DAILY. DO NOT MIX LANTUS WITH ANY OTHER INSULIN       Insulin/GLP-1 Refill Protocol Failed - 2/13/2021  9:33 AM        Failed - Visit with PCP or prescribing provider visit in last 6 months     Last office visit with prescriber/PCP: Visit date not found OR same dept: Visit date not found OR same specialty: 6/17/2019 Darrius Lagunas MD Last physical: Visit date not found Last MTM visit: Visit date not found     Next appt within 3 mo: Visit date not found  Next physical within 3 mo: Visit date not found  Prescriber OR PCP: Darrius Lagunas MD  Last diagnosis associated with med order: 1. Type 2 diabetes mellitus without complication, with long-term current use of insulin (H)  - LANTUS SOLOSTAR U-100 INSULIN 100 unit/mL (3 mL) pen [Pharmacy Med Name: LANTUS SOLOSTAR 100 UNIT/ML]; INJECT 65 UNITS UNDER THE SKIN DAILY. DO NOT MIX LANTUS WITH ANY OTHER INSULIN  Dispense: 15 mL; Refill: 3    If protocol passes may refill for 6 months if within 3 months of last provider visit (or a total of 9 months).              Failed - A1C in last 6 months     Hemoglobin A1c   Date Value Ref Range Status   07/20/2020 8.0 (H) 3.5 - 6.0 % Final               Failed - Microalbumin in last year     Microalbumin, Random Urine   Date Value Ref  Range Status   06/17/2019 <0.50 0.00 - 1.99 mg/dL Final                  Passed - Blood pressure in last year     BP Readings from Last 1 Encounters:   07/20/20 137/69             Passed - Creatinine done in last year     Creatinine   Date Value Ref Range Status   07/20/2020 0.82 0.70 - 1.30 mg/dL Final

## 2021-06-16 PROBLEM — G40.909 SEIZURE DISORDER (H): Status: ACTIVE | Noted: 2017-12-15

## 2021-06-16 PROBLEM — G62.0 CHEMOTHERAPY-INDUCED PERIPHERAL NEUROPATHY (H): Status: ACTIVE | Noted: 2021-03-08

## 2021-06-16 PROBLEM — T45.1X5A CHEMOTHERAPY-INDUCED PERIPHERAL NEUROPATHY (H): Status: ACTIVE | Noted: 2021-03-08

## 2021-06-16 PROBLEM — E66.01 MORBID OBESITY (H): Status: ACTIVE | Noted: 2018-08-29

## 2021-06-19 NOTE — LETTER
Letter by Darrius Lagunas MD at      Author: Darrius Lagunas MD Service: -- Author Type: --    Filed:  Encounter Date: 8/30/2019 Status: (Other)         Hero Harris  [id number] [facility] [id number]             August 30, 2019         Dear Mr. Harris,    Our records indicate that you are due for your annual diabetic eye exam.  If you have already completed this exam within this year please help us get a copy of the exam so we can update your records. They can be faxed to [phone]. If you have not completed this yet this year, please call your eye clinic and schedule an appointment.    Please call with questions or contact us using [employer].    Sincerely,        Electronically signed by Darrius Lagunas MD

## 2021-06-19 NOTE — LETTER
Letter by Darrius Lagunas MD at      Author: Darrius Lagunas MD Service: -- Author Type: --    Filed:  Encounter Date: 9/4/2019 Status: (Other)         Hero Harris  [id number] [facility] [id number]          September 4, 2019      Dear Mr. Harris,    This letter is to inform you that according to our records, you are overdue for a diabetic opthalmology exam. This is an exam recommended yearly to rule out diabetic retinopathy. If you have had this exam done, please contact us with the updated information of when and where your last visit was performed. Otherwise, please schedule this eye exam and remind your eye doctor to fax this information to our clinic so we can update your chart at [phone].        Sincerely,        Electronically signed by Darrius Lagunas MD

## 2021-06-20 NOTE — PROGRESS NOTES
"Sal comes in today to establish care.  A complete review of systems is undertaken and was negative unless noted below.    ILLNESSES, HOSPITALIZATIONS, AND OPERATIONS:    #1.  Metastatic rectal cancer to the liver and the lungs.  This was diagnosed in .  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  He has had multiple cycles of chemotherapy and radiation and he also had two wedge resections of lung metastases and has also had 2 surgical resections of liver metastases.  He also had radiosurgery to lung metastases in .  He sees Dr. Jaida Gipson over at [facility].  Long-term side effects of his treatment include neuropathy in both hands and both feet along with memory loss.    2.  Type 2 diabetes, currently on Lantus at 40 units per day along with NovoLog 12 units 3 times per day with meals.  He admits to \"not being the best diabetic\" and admits to some dietary indiscretion but given the above issues I can understand this from his perspective.    `3.  Status post proctocolectomy with a permanent colostomy.    Sal is a very pleasant male who comes in today to establish care.  He has no acute complaints today.  Past medical and surgical history reviewed.  Medications and allergies were reviewed and reconciled.  He is trained as a  but is currently on disability from his malignancy.  He enjoys fishing.  He is  and lives in [place].    Objective: Vital signs are as per the EMR.  In general the patient is alert pleasant and in no acute distress.  He appears healthy.    Cardiovascular: S1-S2 regular rate and rhythm murmurs gallops rubs    Lungs are clear.    Assessment and plan: Patient presenting to establish care along with the followin.  Type 2 diabetes, degree of control be determined.  Check a hemoglobin A1c, lipid profile, CMP, and a urine microalbumin.  I will call him with results and further " recommendations.  2.  Metastatic rectal cancer.  He sees his oncologist every 3 months.  3.  Healthcare maintenance.  Check a PSA.  Vaccinations are up-to-date.  Follow-up every 6 months.

## 2021-06-25 NOTE — TELEPHONE ENCOUNTER
Refill Approved    Rx renewed per Medication Renewal Policy. Medication was last renewed on 2/22/21.  Last OV 3/8/21.    Karine Arredondo, [facility] Triage/Med Refill 6/7/2021     Requested Prescriptions   Pending Prescriptions Disp Refills     LANTUS SOLOSTAR U-100 INSULIN 100 unit/mL (3 mL) pen [Pharmacy Med Name: LANTUS SOLOSTAR 100 UNIT/ML] 15 mL 3     Sig: INJECT 65 UNITS UNDER THE SKIN DAILY. DO NOT MIX LANTUS WITH ANY OTHER INSULIN       Insulin/GLP-1 Refill Protocol Passed - 6/6/2021 10:10 AM        Passed - Visit with PCP or prescribing provider visit in last 6 months     Last office visit with prescriber/PCP: 3/8/2021 OR same dept: 3/8/2021 Darrius Lagunas MD OR same specialty: 3/8/2021 Darrius Lagunas MD Last physical: Visit date not found Last MTM visit: Visit date not found     Next appt within 3 mo: Visit date not found  Next physical within 3 mo: Visit date not found  Prescriber OR PCP: Darrius Lagunas MD  Last diagnosis associated with med order: 1. Type 2 diabetes mellitus without complication, with long-term current use of insulin (H)  - LANTUS SOLOSTAR U-100 INSULIN 100 unit/mL (3 mL) pen [Pharmacy Med Name: LANTUS SOLOSTAR 100 UNIT/ML]; INJECT 65 UNITS UNDER THE SKIN DAILY. DO NOT MIX LANTUS WITH ANY OTHER INSULIN  Dispense: 15 mL; Refill: 3    If protocol passes may refill for 6 months if within 3 months of last provider visit (or a total of 9 months).              Passed - A1C in last 6 months     Hemoglobin A1c   Date Value Ref Range Status   03/08/2021 7.6 (H) <=5.6 % Final               Passed - Microalbumin in last year     Microalbumin, Random Urine   Date Value Ref Range Status   03/08/2021 1.12 0.00 - 1.99 mg/dL Final                  Passed - Blood pressure in last year     BP Readings from Last 1 Encounters:   03/08/21 118/84             Passed - Creatinine done in last year     Creatinine   Date Value Ref Range Status   03/08/2021 0.83 0.70 - 1.30 mg/dL  Final

## 2021-06-30 ENCOUNTER — HOSPITAL ENCOUNTER (OUTPATIENT)
Dept: CT IMAGING | Facility: CLINIC | Age: 61
Discharge: HOME | End: 2021-06-30
Attending: INTERNAL MEDICINE
Payer: COMMERCIAL

## 2021-06-30 DIAGNOSIS — C20 RECTAL CANCER (H): ICD-10-CM

## 2021-07-03 NOTE — ADDENDUM NOTE
Addendum Note by Darrius Lagunas MD at 6/17/2019  9:40 AM     Author: Darrius Lagunas MD Service: -- Author Type: Physician    Filed: 6/24/2019 10:13 PM Encounter Date: 6/17/2019 Status: Signed    : Darrius Lagunas MD (Physician)    Addended by: DARRIUS LAGUNAS on: 6/24/2019 10:13 PM        Modules accepted: Orders

## 2021-07-04 NOTE — ADDENDUM NOTE
Addendum Note by Darrius Lagunas MD at 3/8/2021  9:40 AM     Author: Darrius Lagunas MD Service: -- Author Type: Physician    Filed: 3/14/2021  2:13 PM Encounter Date: 3/8/2021 Status: Signed    : Darrius Lagunas MD (Physician)    Addended by: DARRIUS LAGUNAS on: 3/14/2021 02:13 PM        Modules accepted: Orders

## 2021-07-07 ENCOUNTER — RECORDS - HEALTHEAST (OUTPATIENT)
Dept: ADMINISTRATIVE | Facility: OTHER | Age: 61
End: 2021-07-07

## 2021-08-03 PROBLEM — C78.00 METASTASIS TO LUNG (H): Status: RESOLVED | Noted: 2018-08-29 | Resolved: 2021-03-08

## 2021-08-04 ENCOUNTER — TELEPHONE (OUTPATIENT)
Dept: INTERNAL MEDICINE | Facility: CLINIC | Age: 61
End: 2021-08-04

## 2021-08-04 NOTE — TELEPHONE ENCOUNTER
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  Please call when complete

## 2021-08-07 ENCOUNTER — HEALTH MAINTENANCE LETTER (OUTPATIENT)
Age: 61
End: 2021-08-07

## 2021-08-12 ENCOUNTER — TELEPHONE (OUTPATIENT)
Dept: INTERNAL MEDICINE | Facility: CLINIC | Age: 61
End: 2021-08-12

## 2021-08-12 NOTE — TELEPHONE ENCOUNTER
"LMTCB - I need to know his \"approximate\" insulin start year to mail this form back to him     ADORE Ferreira  3:59 PM ........ 8/12/2021     "

## 2021-08-12 NOTE — TELEPHONE ENCOUNTER
Patient returning call to let us know the approximate year was 2014.       Thanks,  ..KOLE LedezmaT

## 2021-08-31 DIAGNOSIS — E11.9 TYPE 2 DIABETES MELLITUS WITHOUT COMPLICATION, WITH LONG-TERM CURRENT USE OF INSULIN (H): ICD-10-CM

## 2021-08-31 DIAGNOSIS — Z79.4 TYPE 2 DIABETES MELLITUS WITHOUT COMPLICATION, WITH LONG-TERM CURRENT USE OF INSULIN (H): ICD-10-CM

## 2021-08-31 RX ORDER — ATORVASTATIN CALCIUM 40 MG/1
40 TABLET, FILM COATED ORAL DAILY
Qty: 90 TABLET | Refills: 2 | Status: SHIPPED | OUTPATIENT
Start: 2021-08-31 | End: 2022-06-03

## 2021-09-22 ENCOUNTER — HOSPITAL ENCOUNTER (OUTPATIENT)
Dept: CT IMAGING | Facility: CLINIC | Age: 61
Discharge: HOME | End: 2021-09-22
Attending: INTERNAL MEDICINE | Admitting: INTERNAL MEDICINE
Payer: COMMERCIAL

## 2021-09-22 DIAGNOSIS — C78.00 SECONDARY MALIGNANT NEOPLASM OF LUNG (H): ICD-10-CM

## 2021-09-22 DIAGNOSIS — C78.7 SECONDARY MALIGNANT NEOPLASM OF LIVER (H): ICD-10-CM

## 2021-09-22 LAB
CREAT BLD-MCNC: 1 MG/DL (ref 0.7–1.3)
GFR SERPL CREATININE-BSD FRML MDRD: >60 ML/MIN/1.73M2

## 2021-09-22 PROCEDURE — 250N000011 HC RX IP 250 OP 636: Performed by: INTERNAL MEDICINE

## 2021-09-22 PROCEDURE — 74177 CT ABD & PELVIS W/CONTRAST: CPT

## 2021-09-22 PROCEDURE — 82565 ASSAY OF CREATININE: CPT

## 2021-09-22 RX ORDER — IOPAMIDOL 755 MG/ML
100 INJECTION, SOLUTION INTRAVASCULAR ONCE
Status: COMPLETED | OUTPATIENT
Start: 2021-09-22 | End: 2021-09-22

## 2021-09-22 RX ADMIN — IOPAMIDOL 100 ML: 755 INJECTION, SOLUTION INTRAVENOUS at 11:34

## 2021-09-29 ENCOUNTER — TRANSFERRED RECORDS (OUTPATIENT)
Dept: HEALTH INFORMATION MANAGEMENT | Facility: CLINIC | Age: 61
End: 2021-09-29

## 2021-10-02 ENCOUNTER — HEALTH MAINTENANCE LETTER (OUTPATIENT)
Age: 61
End: 2021-10-02

## 2021-10-09 DIAGNOSIS — E11.9 TYPE 2 DIABETES MELLITUS WITHOUT COMPLICATION, WITH LONG-TERM CURRENT USE OF INSULIN (H): ICD-10-CM

## 2021-10-09 DIAGNOSIS — Z79.4 TYPE 2 DIABETES MELLITUS WITHOUT COMPLICATION, WITH LONG-TERM CURRENT USE OF INSULIN (H): ICD-10-CM

## 2021-10-10 NOTE — TELEPHONE ENCOUNTER
"Routing refill request to provider for review/approval because:  Labs not current:  A1C  Patient needs to be seen because it has been more than 6 months since last office visit.    Last Written Prescription Date:  6/7/21  Last Fill Quantity: 15 mL,  # refills: 3   Last office visit provider:  3/8/21     Requested Prescriptions   Pending Prescriptions Disp Refills     LANTUS SOLOSTAR 100 UNIT/ML soln [Pharmacy Med Name: LANTUS SOLOSTAR 100 UNIT/ML]  3     Sig: INJECT 65 UNITS UNDER THE SKIN DAILY. DO NOT MIX LANTUS WITH ANY OTHER INSULIN       Long Acting Insulin Protocol Failed - 10/9/2021  4:26 PM        Failed - HgbA1C in past 3 or 6 months     If HgbA1C is 8 or greater, it needs to be on file within the past 3 months.  If less than 8, must be on file within the past 6 months.     Recent Labs   Lab Test 03/08/21  1014   A1C 7.6*             Failed - Recent (6 mo) or future (30 days) visit within the authorizing provider's specialty     Patient had office visit in the last 6 months or has a visit in the next 30 days with authorizing provider or within the authorizing provider's specialty.  See \"Patient Info\" tab in inbasket, or \"Choose Columns\" in Meds & Orders section of the refill encounter.            Passed - Serum creatinine on file in past 12 months     Recent Labs   Lab Test 09/22/21  1136   CR 1.0       Ok to refill medication if creatinine is low          Passed - Medication is active on med list        Passed - Patient is age 18 or older             andrew stein RN 10/10/21 3:23 PM  "

## 2021-10-12 RX ORDER — INSULIN GLARGINE 100 [IU]/ML
INJECTION, SOLUTION SUBCUTANEOUS
Qty: 15 ML | Refills: 3 | Status: SHIPPED | OUTPATIENT
Start: 2021-10-12 | End: 2022-01-25

## 2021-10-22 NOTE — TELEPHONE ENCOUNTER
"Last Written Prescription Date:  3/14/21  Last Fill Quantity: 90,  # refills: 1   Last office visit provider:  3/8/21     Requested Prescriptions   Pending Prescriptions Disp Refills     atorvastatin (LIPITOR) 40 MG tablet [Pharmacy Med Name: ATORVASTATIN 40 MG TABLET] 90 tablet 1     Sig: TAKE 1 TABLET BY MOUTH EVERY DAY       Statins Protocol Passed - 8/31/2021 12:24 AM        Passed - LDL on file in past 12 months     Recent Labs   Lab Test 03/08/21  1014                Passed - No abnormal creatine kinase in past 12 months     No lab results found.             Passed - Recent (12 mo) or future (30 days) visit within the authorizing provider's specialty     Patient has had an office visit with the authorizing provider or a provider within the authorizing providers department within the previous 12 mos or has a future within next 30 days. See \"Patient Info\" tab in inbasket, or \"Choose Columns\" in Meds & Orders section of the refill encounter.              Passed - Medication is active on med list        Passed - Patient is age 18 or older             Nabil Mcdonald RN 08/31/21 8:07 AM  " 22-Oct-2021 19:17

## 2021-10-25 ENCOUNTER — OFFICE VISIT (OUTPATIENT)
Dept: INTERNAL MEDICINE | Facility: CLINIC | Age: 61
End: 2021-10-25
Payer: COMMERCIAL

## 2021-10-25 VITALS
SYSTOLIC BLOOD PRESSURE: 140 MMHG | HEART RATE: 90 BPM | OXYGEN SATURATION: 97 % | DIASTOLIC BLOOD PRESSURE: 86 MMHG | BODY MASS INDEX: 36.51 KG/M2 | WEIGHT: 265.5 LBS

## 2021-10-25 DIAGNOSIS — E11.9 TYPE 2 DIABETES MELLITUS WITHOUT COMPLICATION, WITHOUT LONG-TERM CURRENT USE OF INSULIN (H): Primary | ICD-10-CM

## 2021-10-25 LAB — HBA1C MFR BLD: 8.6 % (ref 0–5.6)

## 2021-10-25 PROCEDURE — 99214 OFFICE O/P EST MOD 30 MIN: CPT | Performed by: INTERNAL MEDICINE

## 2021-10-25 PROCEDURE — 36415 COLL VENOUS BLD VENIPUNCTURE: CPT | Performed by: INTERNAL MEDICINE

## 2021-10-25 PROCEDURE — 83036 HEMOGLOBIN GLYCOSYLATED A1C: CPT | Performed by: INTERNAL MEDICINE

## 2021-10-25 NOTE — PROGRESS NOTES
Assessment & Plan   Problem List Items Addressed This Visit     Type 2 diabetes mellitus (H) - Primary    Relevant Orders    Hemoglobin A1c           #1.  Type 2 diabetes, degree of control be determined.  Check a hemoglobin A1c today.  I will call him with results and further recommendations.       ADDENDUM: Needs new ostomy pouches.  He has a history of a proctocolectomy with a permanent ostomy due to metastatic rectal cancer.    No follow-ups on file.    Darrius Lagunas MD  [facility]    Ann Huang comes in today for follow-up of his type 2 diabetes.  He is currently on 50 units of Lantus per day along with 14 units of NovoLog with meals.  He does not check his blood sugars unfortunately.  He denies any symptoms of hypoglycemia.  He denies any chest pain or changes in his chronic or extremity neuropathy.  He continues to see  from [facility] every 3 months in regards to his metastatic rectal cancer.      Objective    BP (!) 140/86 (BP Location: Right arm, Patient Position: Sitting, Cuff Size: Adult Large)   Pulse 90   Wt 120.4 kg (265 lb 8 oz)   SpO2 97%   BMI 36.51 kg/m    Body mass index is 36.51 kg/m .  Physical Exam

## 2021-12-21 ENCOUNTER — HOSPITAL ENCOUNTER (OUTPATIENT)
Dept: CT IMAGING | Facility: CLINIC | Age: 61
Discharge: HOME | End: 2021-12-21
Attending: INTERNAL MEDICINE | Admitting: INTERNAL MEDICINE
Payer: COMMERCIAL

## 2021-12-21 DIAGNOSIS — C78.7 SECONDARY MALIGNANT NEOPLASM OF LIVER (H): ICD-10-CM

## 2021-12-21 LAB
CREAT BLD-MCNC: 0.8 MG/DL (ref 0.7–1.3)
GFR SERPL CREATININE-BSD FRML MDRD: >60 ML/MIN/1.73M2

## 2021-12-21 PROCEDURE — 250N000011 HC RX IP 250 OP 636: Performed by: INTERNAL MEDICINE

## 2021-12-21 PROCEDURE — 74177 CT ABD & PELVIS W/CONTRAST: CPT

## 2021-12-21 PROCEDURE — 82565 ASSAY OF CREATININE: CPT

## 2021-12-21 RX ORDER — IOPAMIDOL 755 MG/ML
100 INJECTION, SOLUTION INTRAVASCULAR ONCE
Status: COMPLETED | OUTPATIENT
Start: 2021-12-21 | End: 2021-12-21

## 2021-12-21 RX ADMIN — IOPAMIDOL 100 ML: 755 INJECTION, SOLUTION INTRAVENOUS at 11:48

## 2021-12-29 ENCOUNTER — TRANSFERRED RECORDS (OUTPATIENT)
Dept: HEALTH INFORMATION MANAGEMENT | Facility: CLINIC | Age: 61
End: 2021-12-29
Payer: COMMERCIAL

## 2022-01-22 DIAGNOSIS — E11.9 TYPE 2 DIABETES MELLITUS WITHOUT COMPLICATION, WITH LONG-TERM CURRENT USE OF INSULIN (H): ICD-10-CM

## 2022-01-22 DIAGNOSIS — Z79.4 TYPE 2 DIABETES MELLITUS WITHOUT COMPLICATION, WITH LONG-TERM CURRENT USE OF INSULIN (H): ICD-10-CM

## 2022-01-25 RX ORDER — INSULIN GLARGINE 100 [IU]/ML
INJECTION, SOLUTION SUBCUTANEOUS
Qty: 57 ML | Refills: 1 | Status: SHIPPED | OUTPATIENT
Start: 2022-01-25 | End: 2022-06-29

## 2022-01-25 NOTE — TELEPHONE ENCOUNTER
"Last Written Prescription Date:  10/12/21  Last Fill Quantity: 15 ml,  # refills: 3   Last office visit provider:  10/25/21     Requested Prescriptions   Pending Prescriptions Disp Refills     LANTUS SOLOSTAR 100 UNIT/ML soln [Pharmacy Med Name: LANTUS SOLOSTAR 100 UNIT/ML] 15 mL 3     Sig: INJECT 65 UNITS UNDER THE SKIN DAILY. DO NOT MIX LANTUS WITH ANY OTHER INSULIN       Long Acting Insulin Protocol Passed - 1/22/2022 12:03 PM        Passed - Serum creatinine on file in past 12 months     Recent Labs   Lab Test 12/21/21  1142   CR 0.8       Ok to refill medication if creatinine is low          Passed - HgbA1C in past 3 or 6 months     If HgbA1C is 8 or greater, it needs to be on file within the past 3 months.  If less than 8, must be on file within the past 6 months.     Recent Labs   Lab Test 10/25/21  1640   A1C 8.6*             Passed - Medication is active on med list        Passed - Patient is age 18 or older        Passed - Recent (6 mo) or future (30 days) visit within the authorizing provider's specialty     Patient had office visit in the last 6 months or has a visit in the next 30 days with authorizing provider or within the authorizing provider's specialty.  See \"Patient Info\" tab in inbasket, or \"Choose Columns\" in Meds & Orders section of the refill encounter.                 Nabil Mcdonald RN 01/25/22 7:25 AM  "

## 2022-03-18 ENCOUNTER — HOSPITAL ENCOUNTER (OUTPATIENT)
Dept: CT IMAGING | Facility: CLINIC | Age: 62
Discharge: HOME | End: 2022-03-18
Attending: NURSE PRACTITIONER | Admitting: NURSE PRACTITIONER
Payer: COMMERCIAL

## 2022-03-18 DIAGNOSIS — C20 RECTAL CANCER (H): ICD-10-CM

## 2022-03-18 PROCEDURE — 250N000011 HC RX IP 250 OP 636: Performed by: NURSE PRACTITIONER

## 2022-03-18 PROCEDURE — 74177 CT ABD & PELVIS W/CONTRAST: CPT

## 2022-03-18 RX ORDER — IOPAMIDOL 755 MG/ML
100 INJECTION, SOLUTION INTRAVASCULAR ONCE
Status: COMPLETED | OUTPATIENT
Start: 2022-03-18 | End: 2022-03-18

## 2022-03-18 RX ADMIN — IOPAMIDOL 100 ML: 755 INJECTION, SOLUTION INTRAVENOUS at 09:06

## 2022-03-23 ENCOUNTER — TRANSFERRED RECORDS (OUTPATIENT)
Dept: HEALTH INFORMATION MANAGEMENT | Facility: CLINIC | Age: 62
End: 2022-03-23
Payer: COMMERCIAL

## 2022-04-11 ENCOUNTER — MEDICAL CORRESPONDENCE (OUTPATIENT)
Dept: HEALTH INFORMATION MANAGEMENT | Facility: CLINIC | Age: 62
End: 2022-04-11
Payer: COMMERCIAL

## 2022-04-30 DIAGNOSIS — E11.9 TYPE 2 DIABETES MELLITUS WITHOUT COMPLICATIONS (H): ICD-10-CM

## 2022-05-03 NOTE — TELEPHONE ENCOUNTER
"Routing refill request to provider for review/approval because:  Labs not current:  A1C  Patient needs to be seen because it has been more than 6 months since last office visit.    Last Written Prescription Date:  2/22/21  Last Fill Quantity: 33 ml,  # refills: 3   Last office visit provider:  10/25/21     Requested Prescriptions   Pending Prescriptions Disp Refills     HUMALOG KWIKPEN 100 UNIT/ML soln [Pharmacy Med Name: HUMALOG 100 UNIT/ML KWIKPEN]  3     Sig: INJECT 12 UNITS UNDER THE SKIN 3 TIMES DAILY BEFORE MEAL.       Short Acting Insulin Protocol Failed - 5/3/2022 11:24 AM        Failed - Serum creatinine on file in past 12 months     Recent Labs   Lab Test 12/21/21  1142   CR 0.8       Ok to refill medication if creatinine is low          Failed - HgbA1C in past 3 or 6 months     If HgbA1C is 8 or greater, it needs to be on file within the past 3 months.  If less than 8, must be on file within the past 6 months.     Recent Labs   Lab Test 10/25/21  1640   A1C 8.6*             Failed - Recent (6 mo) or future (30 days) visit within the authorizing provider's specialty     Patient had office visit in the last 6 months or has a visit in the next 30 days with authorizing provider or within the authorizing provider's specialty.  See \"Patient Info\" tab in inbasket, or \"Choose Columns\" in Meds & Orders section of the refill encounter.            Passed - Medication is active on med list        Passed - Patient is age 18 or older             Nabil Mcdonald RN 05/03/22 11:25 AM  "

## 2022-05-05 RX ORDER — INSULIN LISPRO 100 [IU]/ML
INJECTION, SOLUTION INTRAVENOUS; SUBCUTANEOUS
Qty: 33 ML | Refills: 1 | Status: SHIPPED | OUTPATIENT
Start: 2022-05-05 | End: 2022-12-07

## 2022-05-08 ENCOUNTER — HEALTH MAINTENANCE LETTER (OUTPATIENT)
Age: 62
End: 2022-05-08

## 2022-05-12 ENCOUNTER — OFFICE VISIT (OUTPATIENT)
Dept: INTERNAL MEDICINE | Facility: CLINIC | Age: 62
End: 2022-05-12
Payer: COMMERCIAL

## 2022-05-12 VITALS
SYSTOLIC BLOOD PRESSURE: 138 MMHG | WEIGHT: 261.4 LBS | BODY MASS INDEX: 35.95 KG/M2 | OXYGEN SATURATION: 97 % | DIASTOLIC BLOOD PRESSURE: 84 MMHG | HEART RATE: 71 BPM

## 2022-05-12 DIAGNOSIS — C20 RECTAL CANCER (H): ICD-10-CM

## 2022-05-12 DIAGNOSIS — E11.9 TYPE 2 DIABETES MELLITUS WITHOUT COMPLICATION, WITHOUT LONG-TERM CURRENT USE OF INSULIN (H): Primary | ICD-10-CM

## 2022-05-12 DIAGNOSIS — G40.909 SEIZURE DISORDER (H): ICD-10-CM

## 2022-05-12 LAB
ALBUMIN SERPL-MCNC: 3.7 G/DL (ref 3.5–5)
ALP SERPL-CCNC: 77 U/L (ref 45–120)
ALT SERPL W P-5'-P-CCNC: 27 U/L (ref 0–45)
ANION GAP SERPL CALCULATED.3IONS-SCNC: 10 MMOL/L (ref 5–18)
AST SERPL W P-5'-P-CCNC: 14 U/L (ref 0–40)
BILIRUB SERPL-MCNC: 1.9 MG/DL (ref 0–1)
BUN SERPL-MCNC: 13 MG/DL (ref 8–22)
CALCIUM SERPL-MCNC: 9.4 MG/DL (ref 8.5–10.5)
CHLORIDE BLD-SCNC: 105 MMOL/L (ref 98–107)
CHOLEST SERPL-MCNC: 105 MG/DL
CO2 SERPL-SCNC: 25 MMOL/L (ref 22–31)
CREAT SERPL-MCNC: 0.86 MG/DL (ref 0.7–1.3)
CREAT UR-MCNC: 240 MG/DL
FASTING STATUS PATIENT QL REPORTED: YES
GFR SERPL CREATININE-BSD FRML MDRD: >90 ML/MIN/1.73M2
GLUCOSE BLD-MCNC: 209 MG/DL (ref 70–125)
HBA1C MFR BLD: 9.7 % (ref 0–5.6)
HDLC SERPL-MCNC: 36 MG/DL
LDLC SERPL CALC-MCNC: 37 MG/DL
MICROALBUMIN UR-MCNC: 1.44 MG/DL (ref 0–1.99)
MICROALBUMIN/CREAT UR: 6 MG/G CR
POTASSIUM BLD-SCNC: 4.6 MMOL/L (ref 3.5–5)
PROT SERPL-MCNC: 7.3 G/DL (ref 6–8)
SODIUM SERPL-SCNC: 140 MMOL/L (ref 136–145)
TRIGL SERPL-MCNC: 161 MG/DL

## 2022-05-12 PROCEDURE — 83036 HEMOGLOBIN GLYCOSYLATED A1C: CPT | Performed by: INTERNAL MEDICINE

## 2022-05-12 PROCEDURE — 99214 OFFICE O/P EST MOD 30 MIN: CPT | Performed by: INTERNAL MEDICINE

## 2022-05-12 PROCEDURE — 80061 LIPID PANEL: CPT | Performed by: INTERNAL MEDICINE

## 2022-05-12 PROCEDURE — 36415 COLL VENOUS BLD VENIPUNCTURE: CPT | Performed by: INTERNAL MEDICINE

## 2022-05-12 PROCEDURE — 80053 COMPREHEN METABOLIC PANEL: CPT | Performed by: INTERNAL MEDICINE

## 2022-05-12 PROCEDURE — 82043 UR ALBUMIN QUANTITATIVE: CPT | Performed by: INTERNAL MEDICINE

## 2022-05-12 RX ORDER — FLASH GLUCOSE SCANNING READER
1 EACH MISCELLANEOUS ONCE
Qty: 1 EACH | Refills: 0 | Status: SHIPPED | OUTPATIENT
Start: 2022-05-12 | End: 2022-06-14

## 2022-05-12 RX ORDER — FLASH GLUCOSE SENSOR
1 KIT MISCELLANEOUS
Qty: 2 EACH | Refills: 5 | Status: SHIPPED | OUTPATIENT
Start: 2022-05-12 | End: 2023-04-05

## 2022-05-12 NOTE — PROGRESS NOTES
Assessment & Plan   Problem List Items Addressed This Visit     Rectal cancer (H)    Type 2 diabetes mellitus (H) - Primary    Relevant Medications    Continuous Blood Gluc  (FREESTYLE YE 14 DAY READER) DWIGHT    Continuous Blood Gluc Sensor (FREESTYLE YE 14 DAY SENSOR) MISC    Other Relevant Orders    Lipid panel reflex to direct LDL Fasting    Albumin Random Urine Quantitative with Creat Ratio    HEMOGLOBIN A1C    Comprehensive metabolic panel (BMP + Alb, Alk Phos, ALT, AST, Total. Bili, TP)    Seizure disorder (H)           #1.  Type 2 diabetes, degree of control be determined.  Checking hemoglobin A1c, lipid profile, CMP, and urine microalbumin.  I will contact him with results and further recommendations.  Again I called in a freestyle ye meter and he is going to use this regularly.    2.  History of metastatic rectal cancer.  Follow-up with oncology as scheduled.    3.  History of seizures, stable on Keppra.    No follow-ups on file.    Darrius Lagunas MD  [facility]   Sal is a 61 year old who comes in today for follow-up of his chronic medical conditions.    Type 2 diabetes, currently on 65 units of Lantus per day along with 12 units of Humalog per meal.  He does not check his blood sugars and we discussed the necessity of this, explaining that this would be the only way to know how we would change his insulin regimen should his A1c be elevated.  We discussed a freestyle ye meter which he would be open to and a prescription was sent in today.  No symptoms of hypoglycemia.    History of metastatic rectal cancer.  He continues to follow-up with Dr. Gipson from [facility] every 3 months.  His disease is stable.    History of seizure disorder, currently on Keppra.  Stable, no recurrence.      Objective    /84   Pulse 71   Wt 118.6 kg (261 lb 6.4 oz)   SpO2 97%   BMI 35.95 kg/m    Body mass index is 35.95 kg/m .  Physical Exam

## 2022-06-02 DIAGNOSIS — Z79.4 TYPE 2 DIABETES MELLITUS WITHOUT COMPLICATION, WITH LONG-TERM CURRENT USE OF INSULIN (H): ICD-10-CM

## 2022-06-02 DIAGNOSIS — E11.9 TYPE 2 DIABETES MELLITUS WITHOUT COMPLICATION, WITH LONG-TERM CURRENT USE OF INSULIN (H): ICD-10-CM

## 2022-06-03 RX ORDER — ATORVASTATIN CALCIUM 40 MG/1
TABLET, FILM COATED ORAL
Qty: 90 TABLET | Refills: 2 | Status: SHIPPED | OUTPATIENT
Start: 2022-06-03 | End: 2023-03-02

## 2022-06-03 NOTE — TELEPHONE ENCOUNTER
Prescription approved per [facility] Refill Protocol.  MUMTAZ ValleN, RN  [facility]

## 2022-06-13 DIAGNOSIS — E11.9 TYPE 2 DIABETES MELLITUS WITHOUT COMPLICATION, WITHOUT LONG-TERM CURRENT USE OF INSULIN (H): ICD-10-CM

## 2022-06-14 NOTE — TELEPHONE ENCOUNTER
Panel Detail for FREESTYLE CHILO 14 DAY ORDER PANEL      Outpatient Medication Detail     Disp Refills Start End JAMAL   Continuous Blood Gluc  (FREESTYLE CHILO 14 DAY READER) DWIGHT 1 each 0 2022 --   Sig - Route: 1 each once for 1 dose Use to read blood sugars per 's instructions. - Does not apply   Sent to pharmacy as: FreeStyle Chilo 14 Day [place] Device   Class: E-Prescribe   Order: [id number]   E-Prescribing Status: Receipt confirmed by pharmacy (2022 11:34 AM CDT)     Routing refill request to provider for review/approval because:  Drug not on the [facility] refill protocol    script    Last Written Prescription Date:  22  Last Fill Quantity: 1,  # refills: 0   Last office visit provider:  22     Requested Prescriptions   Pending Prescriptions Disp Refills     Continuous Blood Gluc  (FREESTYLE CHILO 2 READER) DWIGHT [Pharmacy Med Name: FREESTYLE CHILO 2 READER] 1 each 0     Si EACH ONCE FOR 1 DOSE USE TO READ BLOOD SUGARS PER 'S INSTRUCTIONS.       There is no refill protocol information for this order          Nabil Mcdonald RN 22 1:04 PM

## 2022-06-17 ENCOUNTER — HOSPITAL ENCOUNTER (OUTPATIENT)
Dept: CT IMAGING | Facility: CLINIC | Age: 62
Discharge: HOME | End: 2022-06-17
Attending: INTERNAL MEDICINE | Admitting: INTERNAL MEDICINE
Payer: COMMERCIAL

## 2022-06-17 DIAGNOSIS — C78.01 SECONDARY MALIGNANT NEOPLASM OF RIGHT LUNG (H): ICD-10-CM

## 2022-06-17 DIAGNOSIS — C78.7 SECONDARY MALIGNANT NEOPLASM OF LIVER (H): ICD-10-CM

## 2022-06-17 LAB
CREAT BLD-MCNC: 0.9 MG/DL (ref 0.7–1.3)
GFR SERPL CREATININE-BSD FRML MDRD: >60 ML/MIN/1.73M2

## 2022-06-17 PROCEDURE — 74177 CT ABD & PELVIS W/CONTRAST: CPT

## 2022-06-17 PROCEDURE — 82565 ASSAY OF CREATININE: CPT

## 2022-06-17 PROCEDURE — 250N000011 HC RX IP 250 OP 636: Performed by: INTERNAL MEDICINE

## 2022-06-17 RX ORDER — IOPAMIDOL 755 MG/ML
100 INJECTION, SOLUTION INTRAVASCULAR ONCE
Status: COMPLETED | OUTPATIENT
Start: 2022-06-17 | End: 2022-06-17

## 2022-06-17 RX ADMIN — IOPAMIDOL 100 ML: 755 INJECTION, SOLUTION INTRAVENOUS at 09:06

## 2022-06-23 ENCOUNTER — TRANSFERRED RECORDS (OUTPATIENT)
Dept: HEALTH INFORMATION MANAGEMENT | Facility: CLINIC | Age: 62
End: 2022-06-23

## 2022-06-28 DIAGNOSIS — E11.9 TYPE 2 DIABETES MELLITUS WITHOUT COMPLICATION, WITH LONG-TERM CURRENT USE OF INSULIN (H): ICD-10-CM

## 2022-06-28 DIAGNOSIS — Z79.4 TYPE 2 DIABETES MELLITUS WITHOUT COMPLICATION, WITH LONG-TERM CURRENT USE OF INSULIN (H): ICD-10-CM

## 2022-06-29 RX ORDER — INSULIN GLARGINE 100 [IU]/ML
INJECTION, SOLUTION SUBCUTANEOUS
Qty: 60 ML | Refills: 1 | Status: SHIPPED | OUTPATIENT
Start: 2022-06-29 | End: 2023-04-05

## 2022-06-30 NOTE — TELEPHONE ENCOUNTER
"Last Written Prescription Date:  1/25/22  Last Fill Quantity: 57,  # refills: 1   Last office visit provider:  5/12/22     Requested Prescriptions   Pending Prescriptions Disp Refills     LANTUS SOLOSTAR 100 UNIT/ML soln [Pharmacy Med Name: LANTUS SOLOSTAR 100 UNIT/ML]  1     Sig: INJECT 65 UNITS UNDER THE SKIN DAILY. DO NOT MIX LANTUS WITH ANY OTHER INSULIN       Long Acting Insulin Protocol Passed - 6/28/2022  1:11 PM        Passed - Serum creatinine on file in past 12 months     Recent Labs   Lab Test 06/17/22  0903   CR 0.9       Ok to refill medication if creatinine is low          Passed - HgbA1C in past 3 or 6 months     If HgbA1C is 8 or greater, it needs to be on file within the past 3 months.  If less than 8, must be on file within the past 6 months.     Recent Labs   Lab Test 05/12/22  1147   A1C 9.7*             Passed - Medication is active on med list        Passed - Patient is age 18 or older        Passed - Recent (6 mo) or future (30 days) visit within the authorizing provider's specialty     Patient had office visit in the last 6 months or has a visit in the next 30 days with authorizing provider or within the authorizing provider's specialty.  See \"Patient Info\" tab in inbasket, or \"Choose Columns\" in Meds & Orders section of the refill encounter.                 Monica Ovalles RN 06/29/22 7:52 PM  "

## 2022-08-28 ENCOUNTER — HEALTH MAINTENANCE LETTER (OUTPATIENT)
Age: 62
End: 2022-08-28

## 2022-09-15 ENCOUNTER — HOSPITAL ENCOUNTER (OUTPATIENT)
Dept: CT IMAGING | Facility: CLINIC | Age: 62
Discharge: HOME | End: 2022-09-15
Attending: INTERNAL MEDICINE | Admitting: INTERNAL MEDICINE
Payer: COMMERCIAL

## 2022-09-15 DIAGNOSIS — C20 CARCINOMA OF RECTUM (H): ICD-10-CM

## 2022-09-15 PROCEDURE — 250N000011 HC RX IP 250 OP 636: Performed by: INTERNAL MEDICINE

## 2022-09-15 PROCEDURE — 74177 CT ABD & PELVIS W/CONTRAST: CPT

## 2022-09-15 RX ORDER — IOPAMIDOL 755 MG/ML
20 INJECTION, SOLUTION INTRAVASCULAR ONCE
Status: COMPLETED | OUTPATIENT
Start: 2022-09-15 | End: 2022-09-15

## 2022-09-15 RX ORDER — IOPAMIDOL 755 MG/ML
100 INJECTION, SOLUTION INTRAVASCULAR ONCE
Status: COMPLETED | OUTPATIENT
Start: 2022-09-15 | End: 2022-09-15

## 2022-09-15 RX ADMIN — IOPAMIDOL 100 ML: 755 INJECTION, SOLUTION INTRAVENOUS at 11:42

## 2022-09-15 RX ADMIN — IOPAMIDOL 20 ML: 755 INJECTION, SOLUTION INTRAVENOUS at 11:51

## 2022-09-22 ENCOUNTER — TRANSFERRED RECORDS (OUTPATIENT)
Dept: HEALTH INFORMATION MANAGEMENT | Facility: CLINIC | Age: 62
End: 2022-09-22

## 2022-11-01 ENCOUNTER — MEDICAL CORRESPONDENCE (OUTPATIENT)
Dept: HEALTH INFORMATION MANAGEMENT | Facility: CLINIC | Age: 62
End: 2022-11-01

## 2022-12-06 DIAGNOSIS — E11.9 TYPE 2 DIABETES MELLITUS WITHOUT COMPLICATIONS (H): ICD-10-CM

## 2022-12-07 NOTE — TELEPHONE ENCOUNTER
"Routing refill request to provider for review/approval because:  Labs not current:  A1C  Patient needs to be seen because it has been more than 6 months since last office visit.    Last Written Prescription Date:  5/5/22  Last Fill Quantity: 33 ml,  # refills: 1   Last office visit provider:  5/12/22     Requested Prescriptions   Pending Prescriptions Disp Refills     HUMALOG KWIKPEN 100 UNIT/ML soln [Pharmacy Med Name: HUMALOG 100 UNIT/ML KWIKPEN]  1     Sig: INJECT 12 UNITS UNDER THE SKIN 3 TIMES DAILY BEFORE MEAL.       Short Acting Insulin Protocol Failed - 12/7/2022  3:14 PM        Failed - HgbA1C in past 3 or 6 months     If HgbA1C is 8 or greater, it needs to be on file within the past 3 months.  If less than 8, must be on file within the past 6 months.     Recent Labs   Lab Test 05/12/22  1147   A1C 9.7*             Failed - Recent (6 mo) or future (30 days) visit within the authorizing provider's specialty     Patient had office visit in the last 6 months or has a visit in the next 30 days with authorizing provider or within the authorizing provider's specialty.  See \"Patient Info\" tab in inbasket, or \"Choose Columns\" in Meds & Orders section of the refill encounter.            Passed - Serum creatinine on file in past 12 months     Recent Labs   Lab Test 06/17/22  0903   CR 0.9       Ok to refill medication if creatinine is low          Passed - Medication is active on med list        Passed - Patient is age 18 or older             Nabil Mcdonald RN 12/07/22 3:14 PM  "

## 2022-12-12 RX ORDER — INSULIN LISPRO 100 [IU]/ML
INJECTION, SOLUTION INTRAVENOUS; SUBCUTANEOUS
Qty: 30 ML | Refills: 0 | Status: SHIPPED | OUTPATIENT
Start: 2022-12-12 | End: 2023-03-03

## 2022-12-28 ENCOUNTER — HOSPITAL ENCOUNTER (OUTPATIENT)
Dept: CT IMAGING | Facility: CLINIC | Age: 62
Discharge: HOME | End: 2022-12-28
Attending: INTERNAL MEDICINE | Admitting: INTERNAL MEDICINE
Payer: COMMERCIAL

## 2022-12-28 DIAGNOSIS — C78.7 SECONDARY MALIGNANT NEOPLASM OF LIVER (H): ICD-10-CM

## 2022-12-28 LAB
CREAT BLD-MCNC: 0.9 MG/DL (ref 0.7–1.3)
GFR SERPL CREATININE-BSD FRML MDRD: >60 ML/MIN/1.73M2

## 2022-12-28 PROCEDURE — 82565 ASSAY OF CREATININE: CPT

## 2022-12-28 PROCEDURE — 250N000011 HC RX IP 250 OP 636: Performed by: INTERNAL MEDICINE

## 2022-12-28 PROCEDURE — 74177 CT ABD & PELVIS W/CONTRAST: CPT

## 2022-12-28 RX ORDER — IOPAMIDOL 755 MG/ML
100 INJECTION, SOLUTION INTRAVASCULAR ONCE
Status: COMPLETED | OUTPATIENT
Start: 2022-12-28 | End: 2022-12-28

## 2022-12-28 RX ADMIN — IOPAMIDOL 100 ML: 755 INJECTION, SOLUTION INTRAVENOUS at 09:14

## 2023-01-03 ENCOUNTER — TRANSFERRED RECORDS (OUTPATIENT)
Dept: HEALTH INFORMATION MANAGEMENT | Facility: CLINIC | Age: 63
End: 2023-01-03

## 2023-01-14 ENCOUNTER — HEALTH MAINTENANCE LETTER (OUTPATIENT)
Age: 63
End: 2023-01-14

## 2023-03-02 DIAGNOSIS — Z79.4 TYPE 2 DIABETES MELLITUS WITHOUT COMPLICATION, WITH LONG-TERM CURRENT USE OF INSULIN (H): ICD-10-CM

## 2023-03-02 DIAGNOSIS — E11.9 TYPE 2 DIABETES MELLITUS WITHOUT COMPLICATION, WITH LONG-TERM CURRENT USE OF INSULIN (H): ICD-10-CM

## 2023-03-02 DIAGNOSIS — E11.9 TYPE 2 DIABETES MELLITUS WITHOUT COMPLICATIONS (H): ICD-10-CM

## 2023-03-02 RX ORDER — ATORVASTATIN CALCIUM 40 MG/1
TABLET, FILM COATED ORAL
Qty: 90 TABLET | Refills: 0 | Status: SHIPPED | OUTPATIENT
Start: 2023-03-02 | End: 2023-06-06

## 2023-03-02 NOTE — TELEPHONE ENCOUNTER
"Last Written Prescription Date:  6/3/22  Last Fill Quantity: 90,  # refills: 2   Last office visit provider:  5/12/22     Requested Prescriptions   Pending Prescriptions Disp Refills     atorvastatin (LIPITOR) 40 MG tablet [Pharmacy Med Name: ATORVASTATIN 40 MG TABLET] 90 tablet 2     Sig: TAKE 1 TABLET BY MOUTH EVERY DAY       Statins Protocol Passed - 3/2/2023 12:28 AM        Passed - LDL on file in past 12 months     Recent Labs   Lab Test 05/12/22  1147   LDL 37             Passed - No abnormal creatine kinase in past 12 months     No lab results found.             Passed - Recent (12 mo) or future (30 days) visit within the authorizing provider's specialty     Patient has had an office visit with the authorizing provider or a provider within the authorizing providers department within the previous 12 mos or has a future within next 30 days. See \"Patient Info\" tab in inbasket, or \"Choose Columns\" in Meds & Orders section of the refill encounter.              Passed - Medication is active on med list        Passed - Patient is age 18 or older             SHELBY PRAJAPATI RN 03/02/23 3:08 PM  "

## 2023-03-03 RX ORDER — INSULIN LISPRO 100 [IU]/ML
INJECTION, SOLUTION INTRAVENOUS; SUBCUTANEOUS
Qty: 30 ML | Refills: 4 | Status: SHIPPED | OUTPATIENT
Start: 2023-03-03 | End: 2023-04-05

## 2023-03-03 NOTE — TELEPHONE ENCOUNTER
"Routing refill request to provider for review/approval because:  Labs not current:  a1c  Patient needs to be seen because it has been more than 6 months since last office visit.    Requested Prescriptions   Pending Prescriptions Disp Refills    HUMALOG KWIKPEN 100 UNIT/ML soln [Pharmacy Med Name: HUMALOG 100 UNIT/ML KWIKPEN]       Sig: INJECT 12 UNITS UNDER THE SKIN 3 TIMES DAILY BEFORE MEAL.       Short Acting Insulin Protocol Failed - 3/2/2023 10:55 AM        Failed - HgbA1C in past 3 or 6 months     If HgbA1C is 8 or greater, it needs to be on file within the past 3 months.  If less than 8, must be on file within the past 6 months.     Recent Labs   Lab Test 05/12/22  1147   A1C 9.7*             Failed - Recent (6 mo) or future (30 days) visit within the authorizing provider's specialty     Patient had office visit in the last 6 months or has a visit in the next 30 days with authorizing provider or within the authorizing provider's specialty.  See \"Patient Info\" tab in inbasket, or \"Choose Columns\" in Meds & Orders section of the refill encounter.            Passed - Serum creatinine on file in past 12 months     Recent Labs   Lab Test 12/28/22  0912   CR 0.9       Ok to refill medication if creatinine is low          Passed - Medication is active on med list        Passed - Patient is age 18 or older           Maritza Fajardo RN, BSN  [facility]    "

## 2023-03-08 ENCOUNTER — PATIENT OUTREACH (OUTPATIENT)
Dept: INTERNAL MEDICINE | Facility: CLINIC | Age: 63
End: 2023-03-08
Payer: COMMERCIAL

## 2023-03-08 NOTE — TELEPHONE ENCOUNTER
Patient appears due for DM check and wellness visit. Please help schedule with PCP or Denys Blackwood

## 2023-03-10 NOTE — TELEPHONE ENCOUNTER
Patient scheduled for March 16, 2023 @ 810am with Denys Blackwood for Annual Wellness.     Marge Travis MA on 3/10/2023 at 11:12 AM

## 2023-03-16 ENCOUNTER — TELEPHONE (OUTPATIENT)
Dept: FAMILY MEDICINE | Facility: OTHER | Age: 63
End: 2023-03-16

## 2023-03-16 ENCOUNTER — OFFICE VISIT (OUTPATIENT)
Dept: INTERNAL MEDICINE | Facility: CLINIC | Age: 63
End: 2023-03-16
Payer: COMMERCIAL

## 2023-03-16 VITALS
OXYGEN SATURATION: 98 % | WEIGHT: 259 LBS | HEIGHT: 71 IN | HEART RATE: 74 BPM | BODY MASS INDEX: 36.26 KG/M2 | SYSTOLIC BLOOD PRESSURE: 130 MMHG | TEMPERATURE: 98 F | DIASTOLIC BLOOD PRESSURE: 78 MMHG | RESPIRATION RATE: 16 BRPM

## 2023-03-16 DIAGNOSIS — C20 RECTAL CANCER (H): ICD-10-CM

## 2023-03-16 DIAGNOSIS — I26.99 OTHER PULMONARY EMBOLISM WITHOUT ACUTE COR PULMONALE, UNSPECIFIED CHRONICITY (H): ICD-10-CM

## 2023-03-16 DIAGNOSIS — T45.1X5A CHEMOTHERAPY-INDUCED PERIPHERAL NEUROPATHY (H): ICD-10-CM

## 2023-03-16 DIAGNOSIS — Z23 NEED FOR PNEUMOCOCCAL VACCINE: ICD-10-CM

## 2023-03-16 DIAGNOSIS — G62.0 CHEMOTHERAPY-INDUCED PERIPHERAL NEUROPATHY (H): ICD-10-CM

## 2023-03-16 DIAGNOSIS — E11.69 TYPE 2 DIABETES MELLITUS WITH OTHER SPECIFIED COMPLICATION, WITH LONG-TERM CURRENT USE OF INSULIN (H): Primary | ICD-10-CM

## 2023-03-16 DIAGNOSIS — Z79.4 TYPE 2 DIABETES MELLITUS WITH OTHER SPECIFIED COMPLICATION, WITH LONG-TERM CURRENT USE OF INSULIN (H): ICD-10-CM

## 2023-03-16 DIAGNOSIS — Z13.220 SCREENING FOR LIPOID DISORDERS: ICD-10-CM

## 2023-03-16 DIAGNOSIS — Z00.00 ENCOUNTER FOR ANNUAL WELLNESS EXAM IN MEDICARE PATIENT: ICD-10-CM

## 2023-03-16 DIAGNOSIS — Z79.4 TYPE 2 DIABETES MELLITUS WITH OTHER SPECIFIED COMPLICATION, WITH LONG-TERM CURRENT USE OF INSULIN (H): Primary | ICD-10-CM

## 2023-03-16 DIAGNOSIS — E11.69 TYPE 2 DIABETES MELLITUS WITH OTHER SPECIFIED COMPLICATION, WITH LONG-TERM CURRENT USE OF INSULIN (H): ICD-10-CM

## 2023-03-16 DIAGNOSIS — S41.111A LACERATION OF RIGHT UPPER EXTREMITY, INITIAL ENCOUNTER: Primary | ICD-10-CM

## 2023-03-16 DIAGNOSIS — Z11.59 NEED FOR HEPATITIS C SCREENING TEST: ICD-10-CM

## 2023-03-16 DIAGNOSIS — Z11.4 SCREENING FOR HIV (HUMAN IMMUNODEFICIENCY VIRUS): ICD-10-CM

## 2023-03-16 LAB
ALBUMIN SERPL BCG-MCNC: 4.1 G/DL (ref 3.5–5.2)
ALP SERPL-CCNC: 74 U/L (ref 40–129)
ALT SERPL W P-5'-P-CCNC: 36 U/L (ref 10–50)
ANION GAP SERPL CALCULATED.3IONS-SCNC: 11 MMOL/L (ref 7–15)
AST SERPL W P-5'-P-CCNC: 21 U/L (ref 10–50)
BILIRUB SERPL-MCNC: 1.1 MG/DL
BUN SERPL-MCNC: 11.8 MG/DL (ref 8–23)
CALCIUM SERPL-MCNC: 9.1 MG/DL (ref 8.8–10.2)
CHLORIDE SERPL-SCNC: 104 MMOL/L (ref 98–107)
CHOLEST SERPL-MCNC: 112 MG/DL
CREAT SERPL-MCNC: 0.84 MG/DL (ref 0.67–1.17)
DEPRECATED HCO3 PLAS-SCNC: 26 MMOL/L (ref 22–29)
GFR SERPL CREATININE-BSD FRML MDRD: >90 ML/MIN/1.73M2
GLUCOSE SERPL-MCNC: 185 MG/DL (ref 70–99)
HBA1C MFR BLD: 9.1 % (ref 0–5.6)
HCV AB SERPL QL IA: NONREACTIVE
HDLC SERPL-MCNC: 37 MG/DL
HIV 1+2 AB+HIV1 P24 AG SERPL QL IA: NONREACTIVE
LDLC SERPL CALC-MCNC: 47 MG/DL
NONHDLC SERPL-MCNC: 75 MG/DL
POTASSIUM SERPL-SCNC: 4.3 MMOL/L (ref 3.4–5.3)
PROT SERPL-MCNC: 7.1 G/DL (ref 6.4–8.3)
SODIUM SERPL-SCNC: 141 MMOL/L (ref 136–145)
TRIGL SERPL-MCNC: 140 MG/DL

## 2023-03-16 PROCEDURE — 80061 LIPID PANEL: CPT | Performed by: NURSE PRACTITIONER

## 2023-03-16 PROCEDURE — 99214 OFFICE O/P EST MOD 30 MIN: CPT | Mod: 25 | Performed by: NURSE PRACTITIONER

## 2023-03-16 PROCEDURE — 87389 HIV-1 AG W/HIV-1&-2 AB AG IA: CPT | Performed by: NURSE PRACTITIONER

## 2023-03-16 PROCEDURE — 86803 HEPATITIS C AB TEST: CPT | Performed by: NURSE PRACTITIONER

## 2023-03-16 PROCEDURE — 90472 IMMUNIZATION ADMIN EACH ADD: CPT | Performed by: NURSE PRACTITIONER

## 2023-03-16 PROCEDURE — G0438 PPPS, INITIAL VISIT: HCPCS | Performed by: NURSE PRACTITIONER

## 2023-03-16 PROCEDURE — 90677 PCV20 VACCINE IM: CPT | Performed by: NURSE PRACTITIONER

## 2023-03-16 PROCEDURE — 90715 TDAP VACCINE 7 YRS/> IM: CPT | Performed by: NURSE PRACTITIONER

## 2023-03-16 PROCEDURE — 83036 HEMOGLOBIN GLYCOSYLATED A1C: CPT | Performed by: NURSE PRACTITIONER

## 2023-03-16 PROCEDURE — 36415 COLL VENOUS BLD VENIPUNCTURE: CPT | Performed by: NURSE PRACTITIONER

## 2023-03-16 PROCEDURE — 80053 COMPREHEN METABOLIC PANEL: CPT | Performed by: NURSE PRACTITIONER

## 2023-03-16 PROCEDURE — G0009 ADMIN PNEUMOCOCCAL VACCINE: HCPCS | Performed by: NURSE PRACTITIONER

## 2023-03-16 RX ORDER — GLUCOSAMINE HCL/CHONDROITIN SU 500-400 MG
CAPSULE ORAL
Qty: 100 EACH | Refills: 3 | Status: SHIPPED | OUTPATIENT
Start: 2023-03-16

## 2023-03-16 RX ORDER — LANCETS
EACH MISCELLANEOUS
Qty: 200 EACH | Refills: 6 | Status: SHIPPED | OUTPATIENT
Start: 2023-03-16

## 2023-03-16 ASSESSMENT — ENCOUNTER SYMPTOMS
ABDOMINAL PAIN: 0
COUGH: 0
EYE PAIN: 0
NERVOUS/ANXIOUS: 1
HEMATURIA: 0
MYALGIAS: 1
HEADACHES: 0
FREQUENCY: 0
NAUSEA: 0
SHORTNESS OF BREATH: 0
ARTHRALGIAS: 1
FEVER: 0
DYSURIA: 0
PARESTHESIAS: 0
DIARRHEA: 0
DIZZINESS: 0
HEMATOCHEZIA: 0
HEARTBURN: 0
WEAKNESS: 0
JOINT SWELLING: 0
SORE THROAT: 0
CONSTIPATION: 0

## 2023-03-16 ASSESSMENT — ACTIVITIES OF DAILY LIVING (ADL): CURRENT_FUNCTION: BATHING REQUIRES ASSISTANCE

## 2023-03-16 NOTE — PATIENT INSTRUCTIONS
Tetanus shot and pneumonia shot today.    We will check variety of labs.  Results come through [id number].    If your diabetes is not well controlled, I would have you see the pharmacist for discussion your diabetes medications.    Follow-up in 3 months for repeat diabetic check with PCP

## 2023-03-27 ENCOUNTER — HOSPITAL ENCOUNTER (OUTPATIENT)
Dept: CT IMAGING | Facility: CLINIC | Age: 63
Discharge: HOME | End: 2023-03-27
Attending: INTERNAL MEDICINE | Admitting: INTERNAL MEDICINE
Payer: COMMERCIAL

## 2023-03-27 DIAGNOSIS — Z79.4 TYPE 2 DIABETES MELLITUS WITHOUT COMPLICATION, WITH LONG-TERM CURRENT USE OF INSULIN (H): ICD-10-CM

## 2023-03-27 DIAGNOSIS — C20 PRIMARY MALIGNANT NEOPLASM OF RECTUM (H): ICD-10-CM

## 2023-03-27 DIAGNOSIS — C78.00 SECONDARY MALIGNANT NEOPLASM OF LUNG (H): ICD-10-CM

## 2023-03-27 DIAGNOSIS — E11.9 DIABETES MELLITUS (H): ICD-10-CM

## 2023-03-27 DIAGNOSIS — C78.7 SECONDARY MALIGNANT NEOPLASM OF LIVER (H): ICD-10-CM

## 2023-03-27 DIAGNOSIS — E11.9 TYPE 2 DIABETES MELLITUS WITHOUT COMPLICATION, WITH LONG-TERM CURRENT USE OF INSULIN (H): ICD-10-CM

## 2023-03-27 PROCEDURE — 250N000011 HC RX IP 250 OP 636: Performed by: INTERNAL MEDICINE

## 2023-03-27 PROCEDURE — 74177 CT ABD & PELVIS W/CONTRAST: CPT

## 2023-03-27 RX ORDER — IOPAMIDOL 755 MG/ML
100 INJECTION, SOLUTION INTRAVASCULAR ONCE
Status: COMPLETED | OUTPATIENT
Start: 2023-03-27 | End: 2023-03-27

## 2023-03-27 RX ADMIN — IOPAMIDOL 100 ML: 755 INJECTION, SOLUTION INTRAVENOUS at 09:45

## 2023-03-28 NOTE — TELEPHONE ENCOUNTER
Pt called to request refill on pen needles; as well.  Added to E-scribe request for patients Atorvastatin.

## 2023-03-29 RX ORDER — ATORVASTATIN CALCIUM 40 MG/1
TABLET, FILM COATED ORAL
Qty: 90 TABLET | Refills: 0 | OUTPATIENT
Start: 2023-03-29

## 2023-03-29 NOTE — TELEPHONE ENCOUNTER
"atorvastatin (LIPITOR) 40 MG tablet  Last Written Prescription Date:  3/2/2023  Last Fill Quantity: 90,  # refills: 0   Last office visit provider:  3/16/2023    Routing refill request to provider for review/approval because:  A break in medication  Please indicate in sig how many times to use needles a day.    pen needle, diabetic (BD ULTRA-FINE SHORT PEN NEEDLE) 31 gauge x 5/16\" Ndle  Last Written Prescription Date:  10/11/2019  Last Fill Quantity: 400,  # refills: 3   Last office visit provider:  3/16/2023     Requested Prescriptions   Pending Prescriptions Disp Refills     atorvastatin (LIPITOR) 40 MG tablet [Pharmacy Med Name: ATORVASTATIN 40 MG TABLET] 90 tablet 0     Sig: TAKE 1 TABLET BY MOUTH EVERY DAY       Statins Protocol Passed - 3/28/2023  2:11 PM        Passed - LDL on file in past 12 months     Recent Labs   Lab Test 03/16/23  0848   LDL 47             Passed - No abnormal creatine kinase in past 12 months     No lab results found.             Passed - Recent (12 mo) or future (30 days) visit within the authorizing provider's specialty     Patient has had an office visit with the authorizing provider or a provider within the authorizing providers department within the previous 12 mos or has a future within next 30 days. See \"Patient Info\" tab in inbasket, or \"Choose Columns\" in Meds & Orders section of the refill encounter.              Passed - Medication is active on med list        Passed - Patient is age 18 or older           insulin pen needle (31G X 8 MM) 31G X 8 MM miscellaneous 400 each 3     Sig: Use ___ pen needles daily or as directed.       Diabetic Supplies Protocol Passed - 3/28/2023  2:11 PM        Passed - Medication is active on med list        Passed - Patient is 18 years of age or older        Passed - Recent (6 mo) or future (30 days) visit within the authorizing provider's specialty     Patient had office visit in the last 6 months or has a visit in the next 30 days with " "authorizing provider.  See \"Patient Info\" tab in inbasket, or \"Choose Columns\" in Meds & Orders section of the refill encounter.                 Bernadine Mackay RN 03/29/23 1:09 AM  "

## 2023-04-03 ENCOUNTER — TRANSFERRED RECORDS (OUTPATIENT)
Dept: HEALTH INFORMATION MANAGEMENT | Facility: CLINIC | Age: 63
End: 2023-04-03
Payer: COMMERCIAL

## 2023-04-03 NOTE — PROGRESS NOTES
Medication Therapy Management (MTM) Encounter    ASSESSMENT:                            1. Type 2 diabetes mellitus  Most recent A1c elevated above goal <7% per ADA guidelines. Has made significant dietary improvements recently with reported reduction in blood sugars. Congratulated patient on these positive changes and encouraged to continue. Discussed options to optimize his diabetes regimen, minimize use of insulin, and assist with weight loss. First recommend starting metformin to help with insulin sensitization and reduce hepatic glucose production and patient is agreeable. Medication education provided. Will try insulin dose decreases with this addition. Also discussed GLP-1 agonist, which could potentially replace mealtime insulin and has added benefits of weight loss, CV risk reduction, and preservation of pancreatic beta cell function. Patient is hesitant due to wife's experience with these medications. Did explain these are generally well tolerated, but he still declines at this time. Also reviewed blood sugar goals, both fasting and post-prandial. Discussed benefits of CGM, but patient declines.     2. Hyperlipidemia  Appropriately on a high intensity statin given age and diabetes diagnosis per ACC/AHA guidelines.     3. Seizure disorder   Stable on Keppra. Follows with neurology.     4. Pulmonary embolism  Appropriately on anticoagulation with warfarin. Goal INR 2-3 with dosing managed by oncology. No recent s/sx bleeding.     PLAN:                            Start metformin ER. Take 1 tablet (500 mg) daily for 1 week, then increase to 2 tablets (1000 mg) daily.     Decrease Lantus to 44 units injected daily.     Decrease Humalog to 8-12 units three times daily before meals.       Follow-up: Return in about 1 month (around 5/5/2023).    SUBJECTIVE/OBJECTIVE:                          Hero Harris is a 62 year old male called for an initial visit. He was referred to me from Santana Blackwood CNP.       Reason for visit: diabetes.    Allergies/ADRs: Reviewed in chart  Past Medical History: Reviewed in chart  Tobacco: He reports that he has never smoked. He has never used smokeless tobacco.  Alcohol: Less than 1 beverages / week    Medication Adherence/Access: no issues reported. Patient takes medications 2 time(s) per day.     1. Type 2 diabetes mellitus  Currently taking Lantus 54 units daily and Humalog 12-16 units three times daily before meals, depending on meal size. Patient is not experiencing side effects. He has never been on metformin. Declines GLP-1 agonist as he saw these medications make his wife very sick.   Blood sugar monitorin time(s) daily. Ranges (patient reported): Fasting- 140-150, Before dinner: lowest of 108  His wife has a CGM, but says it beeps too much and is not interested.   Symptoms of low blood sugar? Sometimes notices headache when blood sugar close to 100  Symptoms of high blood sugar? none  Eye exam: due - will making an appointment at [facility]  Foot exam: up to date  Diet/Exercise: He and his wife have been making some healthier dietary choices and less carbs. Hasn't had a donut in 3 weeks. More vegetables. Eating nuts for snack instead of chips. More active in the summer. Walks with the dog. Would like to lost 10-15 pounds by the summer.   Aspirin: Not taking due to anticoagulation  Statin: Yes: atorvastatin   ACEi/ARB: No - no microalbuminuria or hypertension     Hemoglobin A1C   Date Value Ref Range Status   2023 9.1 (H) 0.0 - 5.6 % Final     Comment:     Normal <5.7%   Prediabetes 5.7-6.4%    Diabetes 6.5% or higher     Note: Adopted from ADA consensus guidelines.   2022 9.7 (H) 0.0 - 5.6 % Final     Comment:     Normal <5.7%   Prediabetes 5.7-6.4%    Diabetes 6.5% or higher     Note: Adopted from ADA consensus guidelines.   10/25/2021 8.6 (H) 0.0 - 5.6 % Final     Comment:     Normal <5.7%   Prediabetes 5.7-6.4%    Diabetes 6.5% or higher     Note:  "Adopted from ADA consensus guidelines.      Microalbumin Urine mg/dL   Date Value Ref Range Status   05/12/2022 1.44 0.00 - 1.99 mg/dL Final      Creatinine Urine mg/dL   Date Value Ref Range Status   05/12/2022 240 mg/dL Final     LDL Cholesterol Calculated   Date Value Ref Range Status   03/16/2023 47 <=100 mg/dL Final     LDL Cholesterol Direct   Date Value Ref Range Status   06/17/2019 150 (H) <=129 mg/dl Final     Creatinine   Date Value Ref Range Status   03/16/2023 0.84 0.67 - 1.17 mg/dL Final     2. Hyperlipidemia  Current therapy includes atorvastatin 40 mg daily.  Patient reports daily muscle aches, but doesn't think it's from medication. May be residual effects from chemo. Also has numbness in hands and feet.     The ASCVD Risk score (Luis GIPSON, et al., 2019) failed to calculate for the following reasons:    The valid total cholesterol range is 130 to 320 mg/dL  Recent Labs   Lab Test 03/16/23  0848 05/12/22  1147   CHOL 112 105   HDL 37* 36*   LDL 47 37   TRIG 140 161*     3. Seizure disorder   Currently taking Keppra 750 mg twice daily. Has residual left side weakness. Feels it slows down his \"mental acuity.\" Takes extra time to think at times. Follows with neurology.     4. 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 Then had \"blot clot in brain\" and seizure. Patient is currently taking warfarin 5 mg 5 days a week and 7.5 mg twice a week for anticoagulation. Patient reports no current concerns of bruising or bleeding. Sometimes he can notice bleeding around his stoma, but not currently. If he notices this he knows his INR is too high. Oncology manages. Gets INR checked once a month. States INR typically around 2.2.       Vitals:   BP Readings from Last 3 Encounters:   03/16/23 130/78   05/12/22 138/84   10/25/21 (!) 140/86      Pulse Readings from Last 3 Encounters:   03/16/23 74   05/12/22 71   10/25/21 90     Wt Readings from Last 3 Encounters:   03/16/23 259 lb (117.5 kg)   05/12/22 261 " lb 6.4 oz (118.6 kg)   10/25/21 265 lb 8 oz (120.4 kg)     ----------------    I spent 50 minutes with this patient today. All changes were made via collaborative practice agreement with Darrius Lgaunas MD. A copy of the visit note was provided to the patient's provider(s).    A summary of these recommendations was sent via [employer].    Angela Oden, PharmD, BCACP  Medication Management (MTM) Pharmacist  [facility]       Telemedicine Visit Details  Type of service:  Telephone visit  Start Time: 10:00 AM  End Time: 10:50 AM     Medication Therapy Recommendations  Type 2 diabetes mellitus (H)    Current Medication: HUMALOG KWIKPEN 100 UNIT/ML soln (Discontinued)   Rationale: Dose too high - Dosage too high - Safety   Recommendation: Decrease Dose   Status: Accepted per CPA          Current Medication: LANTUS SOLOSTAR 100 UNIT/ML soln (Discontinued)   Rationale: Dose too high - Dosage too high - Safety   Recommendation: Decrease Dose   Status: Accepted per CPA          Current Medication: metFORMIN (GLUCOPHAGE XR) 500 MG 24 hr tablet   Rationale: Synergistic therapy - Needs additional medication therapy - Indication   Recommendation: Start Medication   Status: Accepted per CPA

## 2023-04-05 ENCOUNTER — VIRTUAL VISIT (OUTPATIENT)
Dept: PHARMACY | Facility: CLINIC | Age: 63
End: 2023-04-05
Attending: NURSE PRACTITIONER
Payer: COMMERCIAL

## 2023-04-05 DIAGNOSIS — Z79.4 TYPE 2 DIABETES MELLITUS WITHOUT COMPLICATION, WITH LONG-TERM CURRENT USE OF INSULIN (H): ICD-10-CM

## 2023-04-05 DIAGNOSIS — E78.5 HYPERLIPIDEMIA, UNSPECIFIED HYPERLIPIDEMIA TYPE: ICD-10-CM

## 2023-04-05 DIAGNOSIS — E11.9 TYPE 2 DIABETES MELLITUS WITHOUT COMPLICATION, WITH LONG-TERM CURRENT USE OF INSULIN (H): ICD-10-CM

## 2023-04-05 DIAGNOSIS — I26.99 OTHER PULMONARY EMBOLISM WITHOUT ACUTE COR PULMONALE, UNSPECIFIED CHRONICITY (H): ICD-10-CM

## 2023-04-05 DIAGNOSIS — Z79.4 TYPE 2 DIABETES MELLITUS WITH OTHER SPECIFIED COMPLICATION, WITH LONG-TERM CURRENT USE OF INSULIN (H): Primary | ICD-10-CM

## 2023-04-05 DIAGNOSIS — G40.909 SEIZURE DISORDER (H): ICD-10-CM

## 2023-04-05 DIAGNOSIS — E11.9 TYPE 2 DIABETES MELLITUS WITHOUT COMPLICATIONS (H): ICD-10-CM

## 2023-04-05 DIAGNOSIS — E11.69 TYPE 2 DIABETES MELLITUS WITH OTHER SPECIFIED COMPLICATION, WITH LONG-TERM CURRENT USE OF INSULIN (H): Primary | ICD-10-CM

## 2023-04-05 PROCEDURE — 99605 MTMS BY PHARM NP 15 MIN: CPT | Performed by: PHARMACIST

## 2023-04-05 PROCEDURE — 99607 MTMS BY PHARM ADDL 15 MIN: CPT | Performed by: PHARMACIST

## 2023-04-05 RX ORDER — METFORMIN HCL 500 MG
TABLET, EXTENDED RELEASE 24 HR ORAL
Qty: 180 TABLET | Refills: 3 | Status: SHIPPED | OUTPATIENT
Start: 2023-04-05 | End: 2023-05-10

## 2023-04-05 RX ORDER — INSULIN LISPRO 100 [IU]/ML
8-12 INJECTION, SOLUTION INTRAVENOUS; SUBCUTANEOUS
Qty: 30 ML | Refills: 4
Start: 2023-04-05 | End: 2023-05-10

## 2023-04-05 RX ORDER — INSULIN GLARGINE 100 [IU]/ML
44 INJECTION, SOLUTION SUBCUTANEOUS AT BEDTIME
Qty: 60 ML | Refills: 1
Start: 2023-04-05 | End: 2023-05-10

## 2023-04-05 NOTE — PATIENT INSTRUCTIONS
"Recommendations from today's Medication Management (MTM) visit:                                                      Start metformin ER. Take 1 tablet (500 mg) daily for 1 week, then increase to 2 tablets (1000 mg) daily.     Decrease Lantus to 44 units injected daily.     Decrease Humalog to 8-12 units three times daily before meals.     Blood Sugar Goals  Before meals:   2 hours after meal: less than 180       To schedule another MTM appointment, please call the MTM scheduling line at [phone] or toll-free at [phone].     My MTM pharmacist's contact information:      Please feel free to contact me with any questions or concerns you have via VE[employer] or calling the clinic.       Angela Oden, PharmD, [facility]  Medication Management (MTM) Pharmacist  [facility]     It was great speaking with you today.  I value your experience and would be very thankful for your time in providing feedback in our clinic survey. In the next few days, you may receive an email or text message from [employer] with a link to a survey related to your  clinical pharmacist.\"     "

## 2023-04-05 NOTE — LETTER
_  Medication List        Prepared on: Apr 5, 2023     Bring your Medication List when you go to the doctor, hospital, or   emergency room. And, share it with your family or caregivers.     Note any changes to how you take your medications.  Cross out medications when you no longer use them.    Medication How I take it Why I use it Prescriber   atorvastatin (LIPITOR) 40 MG tablet TAKE 1 TABLET BY MOUTH EVERY DAY Cholesterol  Darrius Lagunas MD   insulin glargine (LANTUS SOLOSTAR) 100 UNIT/ML pen Inject 44 Units Subcutaneous At Bedtime Type 2 diabetes  Darrius Lagunas MD   insulin lispro (HUMALOG KWIKPEN) 100 UNIT/ML (1 unit dial) KWIKPEN Inject 8-12 Units Subcutaneous 3 times daily (before meals) Type 2 diabetes  Darrius Lagunas MD   levETIRAcetam (KEPPRA) 750 MG tablet Take 750 mg by mouth 2 (two) times a day.  Seizure Darrius Lagunas MD   metFORMIN (GLUCOPHAGE XR) 500 MG 24 hr tablet Take 1 tablet (500 mg) by mouth daily for 7 days, THEN 2 tablets (1,000 mg) daily. Type 2 diabetes  Darrius Lagunas MD   warfarin (COUMADIN) 5 MG tablet Take 5-7.5 mg by mouth daily based on INR as instructed.  Pulmonary embolism; blood thinner Darrius Lagunas MD         Add new medications, over-the-counter drugs, herbals, vitamins, or  minerals in the blank rows below.    Medication How I take it Why I use it Prescriber                                      Allergies:      perfume; house dust [dust mite extract]; perfume ht52 [perfume]        Side effects I have had:               Other Information:              My notes and questions:

## 2023-04-05 NOTE — LETTER
"Recommended To-Do List      Prepared on: Apr 5, 2023       You can get the best results from your medications by completing the items on this \"To-Do List.\"      Bring your To-Do List when you go to your doctor. And, share it with your family or caregivers.    My To-Do List:  What we talked about: What I should do:   Your medication dosage being too high    Decrease your dosage of HumaLOG to 8-12 units three times daily before meals.           What we talked about: What I should do:   Your medication dosage being too high    Decrease your dosage of Lantus to 44 units injected daily.           What we talked about: What I should do:   A new medication that may be of benefit to you    Start taking metformin  mg daily for 1 week, then increase to metformin ER 2 tablets (1000 mg) daily.           What we talked about: What I should do:                     "

## 2023-04-05 NOTE — LETTER
April 5, 2023  Hero Harris  [address] [facility] [address]    Dear Mr. Harris, [facility]     Thank you for talking with me on Apr 5, 2023 about your health and medications. As a follow-up to our conversation, I have included two documents:      Your Recommended To-Do List has steps you should take to get the best results from your medications.  Your Medication List will help you keep track of your medications and how to take them.    If you want to talk about these documents, please call Angela Oden PharmD at phone: [phone], Monday-Friday 8-4:30pm.    I look forward to working with you and your doctors to make sure your medications work well for you.    Sincerely,  Angela Oden, PharmD  [facility] Pharmacist, [facility]

## 2023-05-08 NOTE — PATIENT INSTRUCTIONS
"Recommendations from today's Medication Management (MTM) visit:                                                      Increase metformin ER to 4 tablets (2000 mg) daily.     Decrease Lantus to 40 units injected daily.     Decrease Humalog to 6-10 units three times daily before meals.     Blood Sugar Goals  Before meals:   2 hours after meal: less than 180         To schedule another MTM appointment, please call the MTM scheduling line at [phone] or toll-free at [phone].     My MTM pharmacist's contact information:      Please feel free to contact me with any questions or concerns you have via [employer] or calling the clinic.       Angela Oden, PharmD, [facility]  Medication Management (MTM) Pharmacist  [facility]     It was great speaking with you today.  I value your experience and would be very thankful for your time in providing feedback in our clinic survey. In the next few days, you may receive an email or text message from [employer] with a link to a survey related to your  clinical pharmacist.\"     "

## 2023-05-08 NOTE — PROGRESS NOTES
Medication Therapy Management (MTM) Encounter    ASSESSMENT:                            1. Type 2 diabetes mellitus  Most recent A1c elevated above goal <7% per ADA guidelines. Has made significant dietary improvements recently with reported reduction in blood sugars. Congratulated patient on these positive changes and encouraged to continue. Starting metformin has helped reduce insulin requirements. Tolerating so will continue with dose titration to goal of 2000 mg/day and decrease insulin further.   Have previously discussed GLP-1 agonist, which could potentially replace mealtime insulin and has added benefits of weight loss, CV risk reduction, and preservation of pancreatic beta cell function. Patient is hesitant due to wife's experience with these medications. Did explain these are generally well tolerated, but he still declines at this time. Other option is SGLT-2 inhibitor. Discussed benefits of CGM, but patient declines.     2. Hyperlipidemia  Appropriately on a high intensity statin given age and diabetes diagnosis per ACC/AHA guidelines.     3. Seizure disorder   Stable on Keppra. Follows with neurology.     4. Pulmonary embolism  Appropriately on anticoagulation with warfarin. Goal INR 2-3 with dosing managed by oncology. No recent s/sx bleeding.     PLAN:                            Increase metformin ER to 4 tablets (2000 mg) daily.     Decrease Lantus to 40 units injected daily.     Decrease Humalog to 6-10 units three times daily before meals.       Follow-up: Return in about 1 month (around 6/10/2023).    SUBJECTIVE/OBJECTIVE:                          Hero Harris is a 62 year old male called for a follow up visit. He was referred to me from Santana Blackwood CNP. This is a follow up from 4/5/23.      Reason for visit: diabetes follow up    Allergies/ADRs: Reviewed in chart  Past Medical History: Reviewed in chart  Tobacco: He reports that he has never smoked. He has never used smokeless  tobacco.  Alcohol: Less than 1 beverages / week    Medication Adherence/Access: no issues reported. Patient takes medications 2 time(s) per day.     1. Type 2 diabetes mellitus  Currently taking metformin ER 1000 mg daily, Lantus 44 units daily and Humalog 8-12 units two-three times daily before meals, depending on meal size. At our last visit, we started metformin and reduced doses of insulin. Denies any side effects. Has a colostomy. Feels blood sugars are more stable. Not seeing numbers over 200 anymore.    Declines GLP-1 agonist as he saw these medications make his wife very sick.   Blood sugar monitorin-2 time(s) daily. Ranges (patient reported): Fasting- 125-160; before dinner 140-160  His wife has a CGM, but says it beeps too much and is not interested.   Symptoms of low blood sugar? Sometimes notices headache when blood sugar close to 100  Symptoms of high blood sugar? none  Eye exam: due - will making an appointment at [facility]  Foot exam: up to date  Diet/Exercise: He and his wife have been making some healthier dietary choices and less carbs. More vegetables. Eating nuts for snack instead of chips. More active in the summer. Walks with the dog. Would like to lose 10-15 pounds by the summer.   Aspirin: Not taking due to anticoagulation  Statin: Yes: atorvastatin   ACEi/ARB: No - no microalbuminuria or hypertension     Hemoglobin A1C   Date Value Ref Range Status   2023 9.1 (H) 0.0 - 5.6 % Final     Comment:     Normal <5.7%   Prediabetes 5.7-6.4%    Diabetes 6.5% or higher     Note: Adopted from ADA consensus guidelines.   2022 9.7 (H) 0.0 - 5.6 % Final     Comment:     Normal <5.7%   Prediabetes 5.7-6.4%    Diabetes 6.5% or higher     Note: Adopted from ADA consensus guidelines.   10/25/2021 8.6 (H) 0.0 - 5.6 % Final     Comment:     Normal <5.7%   Prediabetes 5.7-6.4%    Diabetes 6.5% or higher     Note: Adopted from ADA consensus guidelines.      Microalbumin Urine mg/dL   Date Value  "Ref Range Status   05/12/2022 1.44 0.00 - 1.99 mg/dL Final      Creatinine Urine mg/dL   Date Value Ref Range Status   05/12/2022 240 mg/dL Final     LDL Cholesterol Calculated   Date Value Ref Range Status   03/16/2023 47 <=100 mg/dL Final     LDL Cholesterol Direct   Date Value Ref Range Status   06/17/2019 150 (H) <=129 mg/dl Final     Creatinine   Date Value Ref Range Status   03/16/2023 0.84 0.67 - 1.17 mg/dL Final     2. Hyperlipidemia  Current therapy includes atorvastatin 40 mg daily.  Patient reports daily muscle aches, but doesn't think it's from medication. May be residual effects from chemo. Also has numbness in hands and feet.     The ASCVD Risk score (Luis GIPSON, et al., 2019) failed to calculate for the following reasons:    The valid total cholesterol range is 130 to 320 mg/dL  Recent Labs   Lab Test 03/16/23  0848 05/12/22  1147   CHOL 112 105   HDL 37* 36*   LDL 47 37   TRIG 140 161*     3. Seizure disorder   Currently taking Keppra 750 mg twice daily. Has residual left side weakness. Feels it slows down his \"mental acuity.\" Takes extra time to think at times. Follows with neurology.     4. 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 Then had \"blot clot in brain\" and seizure. Patient is currently taking warfarin 5 mg 5 days a week and 7.5 mg twice a week for anticoagulation. Patient reports no current concerns of bruising or bleeding. Sometimes he can notice bleeding around his stoma, but not currently. If he notices this he knows his INR is too high. Oncology manages. Gets INR checked once a month. States INR typically around 2.2.       Vitals:   BP Readings from Last 3 Encounters:   03/16/23 130/78   05/12/22 138/84   10/25/21 (!) 140/86      Pulse Readings from Last 3 Encounters:   03/16/23 74   05/12/22 71   10/25/21 90     Wt Readings from Last 3 Encounters:   03/16/23 259 lb (117.5 kg)   05/12/22 261 lb 6.4 oz (118.6 kg)   10/25/21 265 lb 8 oz (120.4 kg)     ----------------    I " spent 15 minutes with this patient today. All changes were made via collaborative practice agreement with Darrius Lagunas MD. A copy of the visit note was provided to the patient's provider(s).    A summary of these recommendations was sent via [employer].    Angela Oden, PharmD, BCACP  Medication Management (MTM) Pharmacist  [facility]       Telemedicine Visit Details  Type of service:  Telephone visit  Start Time: 10:00 AM  End Time: 10:15 AM        Medication Therapy Recommendations  Type 2 diabetes mellitus (H)    Current Medication: insulin glargine (LANTUS SOLOSTAR) 100 UNIT/ML pen (Discontinued)   Rationale: Dose too high - Dosage too high - Safety   Recommendation: Decrease Dose   Status: Accepted per CPA          Current Medication: insulin lispro (HUMALOG KWIKPEN) 100 UNIT/ML (1 unit dial) KWIKPEN (Discontinued)   Rationale: Dose too high - Dosage too high - Safety   Recommendation: Decrease Dose   Status: Accepted per CPA          Current Medication: metFORMIN (GLUCOPHAGE XR) 500 MG 24 hr tablet   Rationale: Dose too low - Dosage too low - Effectiveness   Recommendation: Increase Dose   Status: Accepted per CPA

## 2023-05-10 ENCOUNTER — VIRTUAL VISIT (OUTPATIENT)
Dept: PHARMACY | Facility: CLINIC | Age: 63
End: 2023-05-10
Payer: COMMERCIAL

## 2023-05-10 DIAGNOSIS — E11.9 TYPE 2 DIABETES MELLITUS WITHOUT COMPLICATION, WITH LONG-TERM CURRENT USE OF INSULIN (H): ICD-10-CM

## 2023-05-10 DIAGNOSIS — G40.909 SEIZURE DISORDER (H): ICD-10-CM

## 2023-05-10 DIAGNOSIS — Z79.4 TYPE 2 DIABETES MELLITUS WITHOUT COMPLICATION, WITH LONG-TERM CURRENT USE OF INSULIN (H): ICD-10-CM

## 2023-05-10 DIAGNOSIS — E11.9 TYPE 2 DIABETES MELLITUS WITHOUT COMPLICATIONS (H): ICD-10-CM

## 2023-05-10 DIAGNOSIS — E11.69 TYPE 2 DIABETES MELLITUS WITH OTHER SPECIFIED COMPLICATION, WITH LONG-TERM CURRENT USE OF INSULIN (H): Primary | ICD-10-CM

## 2023-05-10 DIAGNOSIS — Z79.4 TYPE 2 DIABETES MELLITUS WITH OTHER SPECIFIED COMPLICATION, WITH LONG-TERM CURRENT USE OF INSULIN (H): Primary | ICD-10-CM

## 2023-05-10 DIAGNOSIS — E78.5 HYPERLIPIDEMIA, UNSPECIFIED HYPERLIPIDEMIA TYPE: ICD-10-CM

## 2023-05-10 DIAGNOSIS — I26.99 OTHER PULMONARY EMBOLISM WITHOUT ACUTE COR PULMONALE, UNSPECIFIED CHRONICITY (H): ICD-10-CM

## 2023-05-10 PROCEDURE — 99606 MTMS BY PHARM EST 15 MIN: CPT | Performed by: PHARMACIST

## 2023-05-10 RX ORDER — METFORMIN HCL 500 MG
2000 TABLET, EXTENDED RELEASE 24 HR ORAL DAILY
Qty: 360 TABLET | Refills: 3 | Status: SHIPPED | OUTPATIENT
Start: 2023-05-10

## 2023-05-10 RX ORDER — INSULIN GLARGINE 100 [IU]/ML
40 INJECTION, SOLUTION SUBCUTANEOUS AT BEDTIME
Qty: 60 ML | Refills: 1
Start: 2023-05-10 | End: 2023-05-31

## 2023-05-10 RX ORDER — INSULIN LISPRO 100 [IU]/ML
6-10 INJECTION, SOLUTION INTRAVENOUS; SUBCUTANEOUS
Qty: 30 ML | Refills: 4
Start: 2023-05-10 | End: 2024-09-11

## 2023-05-30 ENCOUNTER — TRANSFERRED RECORDS (OUTPATIENT)
Dept: MULTI SPECIALTY CLINIC | Facility: CLINIC | Age: 63
End: 2023-05-30

## 2023-05-30 DIAGNOSIS — Z79.4 TYPE 2 DIABETES MELLITUS WITHOUT COMPLICATION, WITH LONG-TERM CURRENT USE OF INSULIN (H): ICD-10-CM

## 2023-05-30 DIAGNOSIS — E11.9 TYPE 2 DIABETES MELLITUS WITHOUT COMPLICATION, WITH LONG-TERM CURRENT USE OF INSULIN (H): ICD-10-CM

## 2023-05-31 RX ORDER — INSULIN GLARGINE 100 [IU]/ML
INJECTION, SOLUTION SUBCUTANEOUS
Qty: 60 ML | Refills: 0 | Status: SHIPPED | OUTPATIENT
Start: 2023-05-31 | End: 2023-08-21

## 2023-05-31 NOTE — TELEPHONE ENCOUNTER
Last RX was No Print Out on 5/10/2023 disp 60 ml with 1 refill.  Routing remaining refill to requesting pharmacy.   Amirah Bueno RN   05/31/23 2:58 PM  [facility] Nurse Advisor

## 2023-06-06 DIAGNOSIS — E11.9 TYPE 2 DIABETES MELLITUS WITHOUT COMPLICATION, WITH LONG-TERM CURRENT USE OF INSULIN (H): ICD-10-CM

## 2023-06-06 DIAGNOSIS — Z79.4 TYPE 2 DIABETES MELLITUS WITHOUT COMPLICATION, WITH LONG-TERM CURRENT USE OF INSULIN (H): ICD-10-CM

## 2023-06-06 NOTE — TELEPHONE ENCOUNTER
Medication Question or Refill    Contacts       Type Contact Phone/Fax    06/06/2023 03:57 PM CDT Phone (Incoming) Sal Harris (Self) [phone] (M)          What medication are you calling about (include dose and sig)?: atorvastatin (LIPITOR) 40 MG tablet    Preferred Pharmacy:   [facility]/pharmacy #[id number] [place], [place] - [address] AT [facility] [address]  [address] [facility] [address]  Phone: [phone] Fax: [phone]      Controlled Substance Agreement on file:   CSA -- Patient Level:    CSA: None found at the patient level.       Who prescribed the medication?: Dr. Darrius Lagunas    Do you need a refill? Yes    When did you use the medication last? daily    Patient offered an appointment? No    Do you have any questions or concerns?  No      Could we send this information to you in [facility] or would you prefer to receive a phone call?:   Patient would prefer a phone call   Okay to leave a detailed message?: Yes at Cell number on file:    Telephone Information:   Mobile [phone]       Shelley Miguel   [facility]  Central Scheduler

## 2023-06-07 RX ORDER — ATORVASTATIN CALCIUM 40 MG/1
40 TABLET, FILM COATED ORAL DAILY
Qty: 90 TABLET | Refills: 2 | Status: SHIPPED | OUTPATIENT
Start: 2023-06-07 | End: 2024-03-01

## 2023-06-07 NOTE — TELEPHONE ENCOUNTER
"Last Written Prescription Date:  3/2/23  Last Fill Quantity: 90,  # refills: 0   Last office visit provider:  3/16/23     Requested Prescriptions   Pending Prescriptions Disp Refills     atorvastatin (LIPITOR) 40 MG tablet 90 tablet 0     Sig: Take 1 tablet (40 mg) by mouth daily       Statins Protocol Passed - 6/6/2023  3:59 PM        Passed - LDL on file in past 12 months     Recent Labs   Lab Test 03/16/23  0848   LDL 47             Passed - No abnormal creatine kinase in past 12 months     No lab results found.             Passed - Recent (12 mo) or future (30 days) visit within the authorizing provider's specialty     Patient has had an office visit with the authorizing provider or a provider within the authorizing providers department within the previous 12 mos or has a future within next 30 days. See \"Patient Info\" tab in inbasket, or \"Choose Columns\" in Meds & Orders section of the refill encounter.              Passed - Medication is active on med list        Passed - Patient is age 18 or older             Monica Ovalles RN 06/07/23 1:43 PM  "

## 2023-06-13 NOTE — PROGRESS NOTES
Medication Therapy Management (MTM) Encounter    ASSESSMENT:                            1. Type 2 diabetes mellitus  Most recent A1c elevated above goal <7% per ADA guidelines. Has made significant dietary improvements recently with reported reduction in blood sugars. Congratulated patient on these positive changes and encouraged to continue. Starting metformin has also helped reduce insulin requirements.   Have previously discussed GLP-1 agonist, which could potentially replace mealtime insulin and has added benefits of weight loss, CV risk reduction, and preservation of pancreatic beta cell function. Patient is hesitant due to wife's experience with these medications. Did explain these are generally well tolerated, but he still declines at this time. Other option is SGLT-2 inhibitor. Discussed benefits of CGM, but patient declines.   Blood pressure is well controlled and meeting goal of <130/80 mm Hg per ACC/AHA hypertension guidelines. Not on medication. No microalbuminuria.     2. Hyperlipidemia  Appropriately on a high intensity statin given age and diabetes diagnosis per ACC/AHA guidelines.     3. Seizure disorder   Stable on Keppra. Follows with neurology.     4. Pulmonary embolism  Appropriately on anticoagulation with warfarin. Goal INR 2-3 with dosing managed by oncology. No recent s/sx bleeding.     PLAN:                            No medication changes today.       Follow-up: Return in about 1 month (around 7/14/2023). to check A1c and establish care with new PCP    SUBJECTIVE/OBJECTIVE:                          Hero Harris is a 63 year old male called for a follow up visit. He was referred to me from Santana Blackwood CNP. This is a follow up from 5/10/23.      Reason for visit: diabetes follow up    Allergies/ADRs: Reviewed in chart  Past Medical History: Reviewed in chart  Tobacco: He reports that he has never smoked. He has never used smokeless tobacco.  Alcohol: Less than 1 beverages /  week    Medication Adherence/Access: no issues reported. Patient takes medications 2 time(s) per day.     1. Type 2 diabetes mellitus  Currently taking metformin ER 2000 mg daily, Lantus 40 units daily and Humalog 8-12 units two-three times daily before meals, depending on meal size. At our last visit, we increased metformin and reduced doses of insulin. Denies any side effects. Has a colostomy. Feels blood sugars are more stable. Not seeing numbers over 200 anymore. Declines GLP-1 agonist as he saw these medications make his wife very sick.   Blood sugar monitorin-2 time(s) daily. Ranges (patient reported): Fasting- 125-160; before dinner 140-160  His wife has a CGM, but says it beeps too much and is not interested.   Symptoms of low blood sugar? Sometimes notices headache when blood sugar close to 100  Eye exam: due - will making an appointment at [facility]  Foot exam: up to date  Diet/Exercise: He and his wife have been making some healthier dietary choices and less carbs. More vegetables. Eating nuts for snack instead of chips. More active in the summer. Walks with the dog. Would like to lose some weight.   Aspirin: Not taking due to anticoagulation  Statin: Yes: atorvastatin   ACEi/ARB: No - no microalbuminuria or hypertension     Hemoglobin A1C   Date Value Ref Range Status   2023 9.1 (H) 0.0 - 5.6 % Final     Comment:     Normal <5.7%   Prediabetes 5.7-6.4%    Diabetes 6.5% or higher     Note: Adopted from ADA consensus guidelines.   2022 9.7 (H) 0.0 - 5.6 % Final     Comment:     Normal <5.7%   Prediabetes 5.7-6.4%    Diabetes 6.5% or higher     Note: Adopted from ADA consensus guidelines.   10/25/2021 8.6 (H) 0.0 - 5.6 % Final     Comment:     Normal <5.7%   Prediabetes 5.7-6.4%    Diabetes 6.5% or higher     Note: Adopted from ADA consensus guidelines.      Microalbumin Urine mg/dL   Date Value Ref Range Status   2022 1.44 0.00 - 1.99 mg/dL Final      Creatinine Urine mg/dL   Date  "Value Ref Range Status   05/12/2022 240 mg/dL Final     LDL Cholesterol Calculated   Date Value Ref Range Status   03/16/2023 47 <=100 mg/dL Final     LDL Cholesterol Direct   Date Value Ref Range Status   06/17/2019 150 (H) <=129 mg/dl Final     Creatinine   Date Value Ref Range Status   03/16/2023 0.84 0.67 - 1.17 mg/dL Final     2. Hyperlipidemia  Current therapy includes atorvastatin 40 mg daily.  Patient reports daily muscle aches, but doesn't think it's from medication. May be residual effects from chemo. Also has numbness in hands and feet.     The ASCVD Risk score (Luis GIPSON, et al., 2019) failed to calculate for the following reasons:    The valid total cholesterol range is 130 to 320 mg/dL  Recent Labs   Lab Test 03/16/23  0848 05/12/22  1147   CHOL 112 105   HDL 37* 36*   LDL 47 37   TRIG 140 161*     3. Seizure disorder   Currently taking Keppra 750 mg twice daily. Has residual left side weakness. Feels it slows down his \"mental acuity.\" Takes extra time to think at times. Follows with neurology.     4. 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 Then had \"blot clot in brain\" and seizure. Patient is currently taking warfarin 5 mg 5 days a week and 7.5 mg twice a week for anticoagulation. Patient reports no current concerns of bruising or bleeding. Sometimes he can notice bleeding around his stoma, but not currently. If he notices this he knows his INR is too high. Oncology manages. Gets INR checked once a month. States INR typically around 2.2.       Vitals:   BP Readings from Last 3 Encounters:   03/16/23 130/78   05/12/22 138/84   10/25/21 (!) 140/86      Pulse Readings from Last 3 Encounters:   03/16/23 74   05/12/22 71   10/25/21 90     Wt Readings from Last 3 Encounters:   03/16/23 259 lb (117.5 kg)   05/12/22 261 lb 6.4 oz (118.6 kg)   10/25/21 265 lb 8 oz (120.4 kg)     ----------------    I spent 15 minutes with this patient today. All changes were made via collaborative practice " agreement with Darrius Lagunas MD. A copy of the visit note was provided to the patient's provider(s).    A summary of these recommendations was sent via [employer].    Angela Oden, ParamjitD, BCACP  Medication Management (MTM) Pharmacist  [facility]       Telemedicine Visit Details  Type of service:  Telephone visit  Start Time: 2:45 PM  End Time: 3:00 PM        Medication Therapy Recommendations  No medication therapy recommendations to display

## 2023-06-14 ENCOUNTER — VIRTUAL VISIT (OUTPATIENT)
Dept: PHARMACY | Facility: CLINIC | Age: 63
End: 2023-06-14
Payer: COMMERCIAL

## 2023-06-14 DIAGNOSIS — Z79.4 TYPE 2 DIABETES MELLITUS WITH OTHER SPECIFIED COMPLICATION, WITH LONG-TERM CURRENT USE OF INSULIN (H): Primary | ICD-10-CM

## 2023-06-14 DIAGNOSIS — G40.909 SEIZURE DISORDER (H): ICD-10-CM

## 2023-06-14 DIAGNOSIS — E78.5 HYPERLIPIDEMIA, UNSPECIFIED HYPERLIPIDEMIA TYPE: ICD-10-CM

## 2023-06-14 DIAGNOSIS — E11.69 TYPE 2 DIABETES MELLITUS WITH OTHER SPECIFIED COMPLICATION, WITH LONG-TERM CURRENT USE OF INSULIN (H): Primary | ICD-10-CM

## 2023-06-14 DIAGNOSIS — I26.99 OTHER PULMONARY EMBOLISM WITHOUT ACUTE COR PULMONALE, UNSPECIFIED CHRONICITY (H): ICD-10-CM

## 2023-06-14 PROCEDURE — 99606 MTMS BY PHARM EST 15 MIN: CPT | Performed by: PHARMACIST

## 2023-06-14 NOTE — PATIENT INSTRUCTIONS
"Recommendations from today's Medication Management (MTM) visit:                                                      No medication changes today.     Establish care with Santana Blackwood CNP on Tuesday 7/11 at 10:10 am.       To schedule another MTM appointment, please call the MTM scheduling line at [phone] or toll-free at [phone].     My MTM pharmacist's contact information:      Please feel free to contact me with any questions or concerns you have via [employer] or calling the clinic.       Angela Oden, PharmD, [facility] Center[facility]  Medication Management (MTM) Pharmacist  [facility]     It was great speaking with you today.  I value your experience and would be very thankful for your time in providing feedback in our clinic survey. In the next few days, you may receive an email or text message from [employer] with a link to a survey related to your  clinical pharmacist.\"     "

## 2023-07-07 ENCOUNTER — HOSPITAL ENCOUNTER (OUTPATIENT)
Dept: CT IMAGING | Facility: CLINIC | Age: 63
Discharge: HOME | End: 2023-07-07
Attending: INTERNAL MEDICINE | Admitting: INTERNAL MEDICINE
Payer: COMMERCIAL

## 2023-07-07 DIAGNOSIS — I26.99 PULMONARY INFARCTION (H): ICD-10-CM

## 2023-07-07 DIAGNOSIS — C20 MALIGNANT NEOPLASM OF RECTUM (H): ICD-10-CM

## 2023-07-07 LAB
CREAT BLD-MCNC: 1 MG/DL (ref 0.7–1.3)
GFR SERPL CREATININE-BSD FRML MDRD: >60 ML/MIN/1.73M2

## 2023-07-07 PROCEDURE — 74177 CT ABD & PELVIS W/CONTRAST: CPT

## 2023-07-07 PROCEDURE — 82565 ASSAY OF CREATININE: CPT

## 2023-07-07 PROCEDURE — 250N000011 HC RX IP 250 OP 636: Mod: JZ | Performed by: INTERNAL MEDICINE

## 2023-07-07 RX ORDER — IOPAMIDOL 755 MG/ML
100 INJECTION, SOLUTION INTRAVASCULAR ONCE
Status: COMPLETED | OUTPATIENT
Start: 2023-07-07 | End: 2023-07-07

## 2023-07-07 RX ADMIN — IOPAMIDOL 100 ML: 755 INJECTION, SOLUTION INTRAVENOUS at 09:00

## 2023-07-10 ENCOUNTER — TRANSFERRED RECORDS (OUTPATIENT)
Dept: HEALTH INFORMATION MANAGEMENT | Facility: CLINIC | Age: 63
End: 2023-07-10
Payer: COMMERCIAL

## 2023-07-11 ENCOUNTER — OFFICE VISIT (OUTPATIENT)
Dept: INTERNAL MEDICINE | Facility: CLINIC | Age: 63
End: 2023-07-11
Payer: COMMERCIAL

## 2023-07-11 VITALS
WEIGHT: 255.1 LBS | OXYGEN SATURATION: 98 % | DIASTOLIC BLOOD PRESSURE: 84 MMHG | HEIGHT: 71 IN | SYSTOLIC BLOOD PRESSURE: 138 MMHG | HEART RATE: 68 BPM | RESPIRATION RATE: 18 BRPM | TEMPERATURE: 97.7 F | BODY MASS INDEX: 35.71 KG/M2

## 2023-07-11 DIAGNOSIS — Z79.4 TYPE 2 DIABETES MELLITUS WITH OTHER SPECIFIED COMPLICATION, WITH LONG-TERM CURRENT USE OF INSULIN (H): Primary | ICD-10-CM

## 2023-07-11 DIAGNOSIS — E11.69 TYPE 2 DIABETES MELLITUS WITH OTHER SPECIFIED COMPLICATION, WITH LONG-TERM CURRENT USE OF INSULIN (H): Primary | ICD-10-CM

## 2023-07-11 DIAGNOSIS — I26.99 OTHER PULMONARY EMBOLISM WITHOUT ACUTE COR PULMONALE, UNSPECIFIED CHRONICITY (H): ICD-10-CM

## 2023-07-11 DIAGNOSIS — C20 RECTAL CANCER (H): ICD-10-CM

## 2023-07-11 LAB — HBA1C MFR BLD: 7.7 % (ref 0–5.6)

## 2023-07-11 PROCEDURE — 99214 OFFICE O/P EST MOD 30 MIN: CPT | Performed by: NURSE PRACTITIONER

## 2023-07-11 PROCEDURE — 36415 COLL VENOUS BLD VENIPUNCTURE: CPT | Performed by: NURSE PRACTITIONER

## 2023-07-11 PROCEDURE — 83036 HEMOGLOBIN GLYCOSYLATED A1C: CPT | Performed by: NURSE PRACTITIONER

## 2023-07-11 NOTE — PATIENT INSTRUCTIONS
Call [facility] to schedule your diabetic eye exam.  [phone].    Recheck A1c today.    If A1c is above goal, we will have you increase your metformin to 2000 mg daily.    We may need to explore starting a GLP-1 agonist in the future.    Follow-up in 3 months for diabetic recheck

## 2023-07-11 NOTE — PROGRESS NOTES
Assessment & Plan     Type 2 diabetes mellitus with other specified complication, with long-term current use of insulin (H)  He has been working with our [facility] pharmacist on his diabetes.  Recently started metformin 1000 mg daily but has not increased his dose to 2000 mg as recommended by pharmacist.    Recheck A1c today.  If it remains elevated, would increase metformin to 2000 mg before eventually trying GLP-1 agonist.    He is due for diabetic eye exam.    - Hemoglobin A1c; Future  - Adult Eye  Referral; Future    Rectal cancer (H)  Continues to work with Dr. Gipson.  Recent negative CT scan    Other pulmonary embolism without acute cor pulmonale, unspecified chronicity (H)  Continues on warfarin, managed by oncology with INR is in goal range    Patient Instructions   Call [facility] to schedule your diabetic eye exam.  [phone].    Recheck A1c today.    If A1c is above goal, we will have you increase your metformin to 2000 mg daily.    We may need to explore starting a GLP-1 agonist in the future.    Follow-up in 3 months for diabetic recheck      Santana Blackwood [facility]   Sal is a 63 year old, presenting for the following health issues:    Establish Care (Been taking two pills of Metformin instead of four, spoke with the pharmacist about this. Remission from metastatic colon cancer, visit with onc yesterday. One course of radiation left if needed.)        7/11/2023     9:58 AM   Additional Questions   Roomed by Shima     History of Present Illness       Diabetes:   He presents for follow up of diabetes.  He is checking home blood glucose one time daily. He checks blood glucose before meals.  Blood glucose is never over 200 and never under 70. He is aware of hypoglycemia symptoms including other. He has no concerns regarding his diabetes at this time.  He is having numbness in feet. The patient has not had a diabetic eye exam  "in the last 12 months.         Hyperlipidemia:  He presents for follow up of hyperlipidemia.  He is taking medication to lower cholesterol. He is not having myalgia or other side effects to statin medications.    He eats 2-3 servings of fruits and vegetables daily.He consumes 0 sweetened beverage(s) daily.He exercises with enough effort to increase his heart rate 10 to 19 minutes per day.  He exercises with enough effort to increase his heart rate 4 days per week.   He is taking medications regularly.           Review of Systems   Constitutional, HEENT, cardiovascular, pulmonary, gi and gu systems are negative, except as otherwise noted.      Objective    BP (!) 146/71 (BP Location: Right arm, Patient Position: Sitting, Cuff Size: Adult Large)   Pulse 68   Temp 97.7  F (36.5  C) (Oral)   Resp 18   Ht 1.815 m (5' 11.46\")   Wt 115.7 kg (255 lb 1.6 oz)   SpO2 98%   BMI 35.13 kg/m    Body mass index is 35.13 kg/m .  Physical Exam     Patient is healthy-appearing and in no acute distress.      "

## 2023-07-12 NOTE — PROGRESS NOTES
Order(s) created erroneously. Erroneous order ID: [id number]   Order canceled by: CHANNING HOWARD   Order cancel date/time: 07/12/2023 10:27 AM

## 2023-08-01 ENCOUNTER — TRANSFERRED RECORDS (OUTPATIENT)
Dept: HEALTH INFORMATION MANAGEMENT | Facility: CLINIC | Age: 63
End: 2023-08-01
Payer: COMMERCIAL

## 2023-08-01 LAB — RETINOPATHY: NEGATIVE

## 2023-08-21 DIAGNOSIS — Z79.4 TYPE 2 DIABETES MELLITUS WITHOUT COMPLICATION, WITH LONG-TERM CURRENT USE OF INSULIN (H): ICD-10-CM

## 2023-08-21 DIAGNOSIS — E11.9 TYPE 2 DIABETES MELLITUS WITHOUT COMPLICATION, WITH LONG-TERM CURRENT USE OF INSULIN (H): ICD-10-CM

## 2023-08-21 RX ORDER — INSULIN GLARGINE 100 [IU]/ML
INJECTION, SOLUTION SUBCUTANEOUS
Qty: 60 ML | Refills: 1 | Status: SHIPPED | OUTPATIENT
Start: 2023-08-21 | End: 2024-02-20

## 2023-08-21 NOTE — TELEPHONE ENCOUNTER
"Last Written Prescription Date:  5/31/23  Last Fill Quantity: 60 ml,  # refills: 0   Last office visit provider:  7/11/23     Requested Prescriptions   Pending Prescriptions Disp Refills    LANTUS SOLOSTAR 100 UNIT/ML soln [Pharmacy Med Name: LANTUS SOLOSTAR 100 UNIT/ML]       Sig: INJECT 65 UNITS UNDER THE SKIN DAILY. DO NOT MIX LANTUS WITH ANY OTHER INSULIN       Long Acting Insulin Protocol Failed - 8/21/2023  3:39 PM        Failed - Recent (6 mo) or future (30 days) visit within the authorizing provider's specialty     Patient had office visit in the last 6 months or has a visit in the next 30 days with authorizing provider or within the authorizing provider's specialty.  See \"Patient Info\" tab in inbasket, or \"Choose Columns\" in Meds & Orders section of the refill encounter.            Passed - Serum creatinine on file in past 12 months     Recent Labs   Lab Test 07/07/23  0858   CR 1.0       Ok to refill medication if creatinine is low          Passed - HgbA1C in past 3 or 6 months     If HgbA1C is 8 or greater, it needs to be on file within the past 3 months.  If less than 8, must be on file within the past 6 months.     Recent Labs   Lab Test 07/11/23  1047   A1C 7.7*             Passed - Medication is active on med list        Passed - Patient is age 18 or older             JIMENEZ TRIANA RN 08/21/23 4:27 PM  "

## 2023-10-11 ENCOUNTER — HOSPITAL ENCOUNTER (OUTPATIENT)
Dept: CT IMAGING | Facility: CLINIC | Age: 63
Discharge: HOME | End: 2023-10-11
Attending: INTERNAL MEDICINE | Admitting: INTERNAL MEDICINE
Payer: COMMERCIAL

## 2023-10-11 DIAGNOSIS — C20 RECTAL CANCER (H): ICD-10-CM

## 2023-10-11 LAB
CREAT BLD-MCNC: 0.8 MG/DL (ref 0.7–1.3)
EGFRCR SERPLBLD CKD-EPI 2021: >60 ML/MIN/1.73M2

## 2023-10-11 PROCEDURE — 74177 CT ABD & PELVIS W/CONTRAST: CPT

## 2023-10-11 PROCEDURE — 82565 ASSAY OF CREATININE: CPT

## 2023-10-11 PROCEDURE — 250N000011 HC RX IP 250 OP 636: Mod: JZ | Performed by: INTERNAL MEDICINE

## 2023-10-11 RX ORDER — IOPAMIDOL 755 MG/ML
90 INJECTION, SOLUTION INTRAVASCULAR ONCE
Status: COMPLETED | OUTPATIENT
Start: 2023-10-11 | End: 2023-10-11

## 2023-10-11 RX ADMIN — IOPAMIDOL 90 ML: 755 INJECTION, SOLUTION INTRAVENOUS at 09:56

## 2023-10-18 ENCOUNTER — TRANSFERRED RECORDS (OUTPATIENT)
Dept: HEALTH INFORMATION MANAGEMENT | Facility: CLINIC | Age: 63
End: 2023-10-18
Payer: COMMERCIAL

## 2023-10-24 ENCOUNTER — PATIENT OUTREACH (OUTPATIENT)
Dept: GASTROENTEROLOGY | Facility: CLINIC | Age: 63
End: 2023-10-24
Payer: COMMERCIAL

## 2024-01-10 ENCOUNTER — HOSPITAL ENCOUNTER (OUTPATIENT)
Dept: CT IMAGING | Facility: CLINIC | Age: 64
Discharge: HOME | End: 2024-01-10
Attending: INTERNAL MEDICINE | Admitting: INTERNAL MEDICINE
Payer: COMMERCIAL

## 2024-01-10 DIAGNOSIS — C20 MALIGNANT NEOPLASM OF RECTUM (H): ICD-10-CM

## 2024-01-10 DIAGNOSIS — I26.99 PULMONARY EMBOLISM ON LONG-TERM ANTICOAGULATION THERAPY (H): ICD-10-CM

## 2024-01-10 DIAGNOSIS — Z79.01 PULMONARY EMBOLISM ON LONG-TERM ANTICOAGULATION THERAPY (H): ICD-10-CM

## 2024-01-10 LAB
CREAT BLD-MCNC: 0.9 MG/DL (ref 0.7–1.3)
EGFRCR SERPLBLD CKD-EPI 2021: >60 ML/MIN/1.73M2

## 2024-01-10 PROCEDURE — 71260 CT THORAX DX C+: CPT

## 2024-01-10 PROCEDURE — 250N000011 HC RX IP 250 OP 636: Performed by: INTERNAL MEDICINE

## 2024-01-10 PROCEDURE — 82565 ASSAY OF CREATININE: CPT

## 2024-01-10 RX ORDER — IOPAMIDOL 755 MG/ML
90 INJECTION, SOLUTION INTRAVASCULAR ONCE
Status: COMPLETED | OUTPATIENT
Start: 2024-01-10 | End: 2024-01-10

## 2024-01-10 RX ADMIN — IOPAMIDOL 90 ML: 755 INJECTION, SOLUTION INTRAVENOUS at 09:29

## 2024-01-17 ENCOUNTER — TRANSFERRED RECORDS (OUTPATIENT)
Dept: HEALTH INFORMATION MANAGEMENT | Facility: CLINIC | Age: 64
End: 2024-01-17
Payer: COMMERCIAL

## 2024-01-17 ENCOUNTER — MEDICAL CORRESPONDENCE (OUTPATIENT)
Dept: SCHEDULING | Facility: CLINIC | Age: 64
End: 2024-01-17
Payer: COMMERCIAL

## 2024-02-15 ENCOUNTER — PATIENT OUTREACH (OUTPATIENT)
Dept: CARE COORDINATION | Facility: CLINIC | Age: 64
End: 2024-02-15
Payer: COMMERCIAL

## 2024-02-17 ENCOUNTER — HEALTH MAINTENANCE LETTER (OUTPATIENT)
Age: 64
End: 2024-02-17

## 2024-02-20 DIAGNOSIS — E11.9 TYPE 2 DIABETES MELLITUS WITHOUT COMPLICATION, WITH LONG-TERM CURRENT USE OF INSULIN (H): ICD-10-CM

## 2024-02-20 DIAGNOSIS — Z79.4 TYPE 2 DIABETES MELLITUS WITHOUT COMPLICATION, WITH LONG-TERM CURRENT USE OF INSULIN (H): ICD-10-CM

## 2024-02-21 RX ORDER — INSULIN GLARGINE 100 [IU]/ML
INJECTION, SOLUTION SUBCUTANEOUS
Qty: 60 ML | Refills: 1 | Status: SHIPPED | OUTPATIENT
Start: 2024-02-21 | End: 2024-09-03

## 2024-02-29 ENCOUNTER — PATIENT OUTREACH (OUTPATIENT)
Dept: CARE COORDINATION | Facility: CLINIC | Age: 64
End: 2024-02-29
Payer: COMMERCIAL

## 2024-02-29 DIAGNOSIS — Z79.4 TYPE 2 DIABETES MELLITUS WITHOUT COMPLICATION, WITH LONG-TERM CURRENT USE OF INSULIN (H): ICD-10-CM

## 2024-02-29 DIAGNOSIS — E11.9 TYPE 2 DIABETES MELLITUS WITHOUT COMPLICATION, WITH LONG-TERM CURRENT USE OF INSULIN (H): ICD-10-CM

## 2024-03-01 RX ORDER — ATORVASTATIN CALCIUM 40 MG/1
40 TABLET, FILM COATED ORAL DAILY
Qty: 90 TABLET | Refills: 2 | Status: SHIPPED | OUTPATIENT
Start: 2024-03-01

## 2024-03-21 ENCOUNTER — HOSPITAL ENCOUNTER (OUTPATIENT)
Dept: MRI IMAGING | Facility: CLINIC | Age: 64
Discharge: HOME | End: 2024-03-21
Attending: RADIOLOGY | Admitting: RADIOLOGY
Payer: COMMERCIAL

## 2024-03-21 DIAGNOSIS — Q28.3 OTHER MALFORMATIONS OF CEREBRAL VESSELS: ICD-10-CM

## 2024-03-21 PROCEDURE — 70544 MR ANGIOGRAPHY HEAD W/O DYE: CPT

## 2024-04-08 ENCOUNTER — HOSPITAL ENCOUNTER (OUTPATIENT)
Dept: CT IMAGING | Facility: CLINIC | Age: 64
Discharge: HOME | End: 2024-04-08
Attending: INTERNAL MEDICINE | Admitting: INTERNAL MEDICINE
Payer: COMMERCIAL

## 2024-04-08 DIAGNOSIS — C20 MALIGNANT NEOPLASM OF RECTUM (H): ICD-10-CM

## 2024-04-08 DIAGNOSIS — T81.718A IATROGENIC PULMONARY EMBOLISM AND INFARCTION (H): ICD-10-CM

## 2024-04-08 DIAGNOSIS — I26.99 IATROGENIC PULMONARY EMBOLISM AND INFARCTION (H): ICD-10-CM

## 2024-04-08 LAB
CREAT BLD-MCNC: 0.9 MG/DL (ref 0.7–1.3)
EGFRCR SERPLBLD CKD-EPI 2021: >60 ML/MIN/1.73M2

## 2024-04-08 PROCEDURE — 82565 ASSAY OF CREATININE: CPT

## 2024-04-08 PROCEDURE — 71260 CT THORAX DX C+: CPT

## 2024-04-08 PROCEDURE — 250N000011 HC RX IP 250 OP 636: Performed by: INTERNAL MEDICINE

## 2024-04-08 RX ORDER — IOPAMIDOL 755 MG/ML
90 INJECTION, SOLUTION INTRAVASCULAR ONCE
Status: COMPLETED | OUTPATIENT
Start: 2024-04-08 | End: 2024-04-08

## 2024-04-08 RX ADMIN — IOPAMIDOL 90 ML: 755 INJECTION, SOLUTION INTRAVENOUS at 10:13

## 2024-04-17 ENCOUNTER — TRANSFERRED RECORDS (OUTPATIENT)
Dept: HEALTH INFORMATION MANAGEMENT | Facility: CLINIC | Age: 64
End: 2024-04-17
Payer: COMMERCIAL

## 2024-04-27 ENCOUNTER — HEALTH MAINTENANCE LETTER (OUTPATIENT)
Age: 64
End: 2024-04-27

## 2024-07-29 ENCOUNTER — HOSPITAL ENCOUNTER (OUTPATIENT)
Dept: CT IMAGING | Facility: CLINIC | Age: 64
Discharge: HOME | End: 2024-07-29
Attending: INTERNAL MEDICINE | Admitting: INTERNAL MEDICINE
Payer: COMMERCIAL

## 2024-07-29 DIAGNOSIS — C20 RECTAL CANCER (H): ICD-10-CM

## 2024-07-29 LAB
CREAT BLD-MCNC: 0.9 MG/DL (ref 0.7–1.3)
EGFRCR SERPLBLD CKD-EPI 2021: >60 ML/MIN/1.73M2

## 2024-07-29 PROCEDURE — 71260 CT THORAX DX C+: CPT

## 2024-07-29 PROCEDURE — 250N000011 HC RX IP 250 OP 636: Performed by: INTERNAL MEDICINE

## 2024-07-29 PROCEDURE — 82565 ASSAY OF CREATININE: CPT

## 2024-07-29 RX ORDER — IOPAMIDOL 755 MG/ML
90 INJECTION, SOLUTION INTRAVASCULAR ONCE
Status: COMPLETED | OUTPATIENT
Start: 2024-07-29 | End: 2024-07-29

## 2024-07-29 RX ADMIN — IOPAMIDOL 90 ML: 755 INJECTION, SOLUTION INTRAVENOUS at 08:30

## 2024-08-05 ENCOUNTER — TRANSFERRED RECORDS (OUTPATIENT)
Dept: HEALTH INFORMATION MANAGEMENT | Facility: CLINIC | Age: 64
End: 2024-08-05
Payer: COMMERCIAL

## 2024-08-30 DIAGNOSIS — Z79.4 TYPE 2 DIABETES MELLITUS WITHOUT COMPLICATION, WITH LONG-TERM CURRENT USE OF INSULIN (H): ICD-10-CM

## 2024-08-30 DIAGNOSIS — E11.9 TYPE 2 DIABETES MELLITUS WITHOUT COMPLICATION, WITH LONG-TERM CURRENT USE OF INSULIN (H): ICD-10-CM

## 2024-09-03 RX ORDER — INSULIN GLARGINE 100 [IU]/ML
INJECTION, SOLUTION SUBCUTANEOUS
Qty: 75 ML | Refills: 1 | Status: SHIPPED | OUTPATIENT
Start: 2024-09-03

## 2024-09-08 ENCOUNTER — HEALTH MAINTENANCE LETTER (OUTPATIENT)
Age: 64
End: 2024-09-08

## 2024-09-11 ENCOUNTER — TELEPHONE (OUTPATIENT)
Dept: INTERNAL MEDICINE | Facility: CLINIC | Age: 64
End: 2024-09-11
Payer: COMMERCIAL

## 2024-09-11 DIAGNOSIS — Z79.4 TYPE 2 DIABETES MELLITUS WITHOUT COMPLICATION, WITH LONG-TERM CURRENT USE OF INSULIN (H): ICD-10-CM

## 2024-09-11 DIAGNOSIS — E11.9 TYPE 2 DIABETES MELLITUS WITHOUT COMPLICATION, WITH LONG-TERM CURRENT USE OF INSULIN (H): ICD-10-CM

## 2024-09-11 RX ORDER — INSULIN LISPRO 100 [IU]/ML
6-10 INJECTION, SOLUTION INTRAVENOUS; SUBCUTANEOUS
Qty: 30 ML | Refills: 4 | Status: SHIPPED | OUTPATIENT
Start: 2024-09-11

## 2024-09-11 NOTE — TELEPHONE ENCOUNTER
Patient requesting refill of     insulin lispro (HUMALOG KWIKPEN) 100 UNIT/ML (1 unit dial) KWIKPEN     When going to pend med, there was a pop up for cost based alternatives. Writer unsure which was correct so it's not pended.

## 2024-10-28 ENCOUNTER — HOSPITAL ENCOUNTER (OUTPATIENT)
Dept: CT IMAGING | Facility: CLINIC | Age: 64
Discharge: HOME | End: 2024-10-28
Attending: INTERNAL MEDICINE | Admitting: INTERNAL MEDICINE
Payer: COMMERCIAL

## 2024-10-28 DIAGNOSIS — C20 MALIGNANT NEOPLASM OF RECTUM (H): ICD-10-CM

## 2024-10-28 PROCEDURE — 74177 CT ABD & PELVIS W/CONTRAST: CPT

## 2024-10-28 PROCEDURE — 250N000011 HC RX IP 250 OP 636: Performed by: INTERNAL MEDICINE

## 2024-10-28 RX ORDER — IOPAMIDOL 755 MG/ML
90 INJECTION, SOLUTION INTRAVASCULAR ONCE
Status: COMPLETED | OUTPATIENT
Start: 2024-10-28 | End: 2024-10-28

## 2024-10-28 RX ADMIN — IOPAMIDOL 90 ML: 755 INJECTION, SOLUTION INTRAVENOUS at 09:03

## 2024-11-05 ENCOUNTER — TRANSFERRED RECORDS (OUTPATIENT)
Dept: HEALTH INFORMATION MANAGEMENT | Facility: CLINIC | Age: 64
End: 2024-11-05
Payer: COMMERCIAL

## 2025-01-13 DIAGNOSIS — E11.69 TYPE 2 DIABETES MELLITUS WITH OTHER SPECIFIED COMPLICATION, WITH LONG-TERM CURRENT USE OF INSULIN (H): ICD-10-CM

## 2025-01-13 DIAGNOSIS — Z79.4 TYPE 2 DIABETES MELLITUS WITH OTHER SPECIFIED COMPLICATION, WITH LONG-TERM CURRENT USE OF INSULIN (H): ICD-10-CM

## 2025-01-13 NOTE — TELEPHONE ENCOUNTER
metFORMIN (GLUCOPHAGE XR) 500 MG 24 hr tablet     [facility]Y #[id number] - [place], [place] - [address] AT [facility] [address] & [address]

## 2025-01-14 RX ORDER — METFORMIN HYDROCHLORIDE 500 MG/1
2000 TABLET, EXTENDED RELEASE ORAL DAILY
Qty: 360 TABLET | Refills: 3 | Status: SHIPPED | OUTPATIENT
Start: 2025-01-14

## 2025-01-14 NOTE — TELEPHONE ENCOUNTER
Outgoing call to patient, patient states he hasn't had a break in medication and has been taking it as prescribed.

## 2025-01-29 ENCOUNTER — HOSPITAL ENCOUNTER (OUTPATIENT)
Dept: CT IMAGING | Facility: CLINIC | Age: 65
Discharge: HOME | End: 2025-01-29
Attending: INTERNAL MEDICINE
Payer: COMMERCIAL

## 2025-01-29 DIAGNOSIS — C20 RECTAL CANCER (H): ICD-10-CM

## 2025-01-29 LAB
CREAT BLD-MCNC: 1 MG/DL (ref 0.7–1.3)
EGFRCR SERPLBLD CKD-EPI 2021: >60 ML/MIN/1.73M2

## 2025-01-29 PROCEDURE — 71260 CT THORAX DX C+: CPT

## 2025-01-29 PROCEDURE — 82565 ASSAY OF CREATININE: CPT

## 2025-01-29 PROCEDURE — 74177 CT ABD & PELVIS W/CONTRAST: CPT

## 2025-01-29 PROCEDURE — 250N000011 HC RX IP 250 OP 636: Performed by: INTERNAL MEDICINE

## 2025-01-29 RX ORDER — IOPAMIDOL 755 MG/ML
90 INJECTION, SOLUTION INTRAVASCULAR ONCE
Status: COMPLETED | OUTPATIENT
Start: 2025-01-29 | End: 2025-01-29

## 2025-01-29 RX ADMIN — IOPAMIDOL 90 ML: 755 INJECTION, SOLUTION INTRAVENOUS at 09:31

## 2025-02-05 ENCOUNTER — TRANSFERRED RECORDS (OUTPATIENT)
Dept: HEALTH INFORMATION MANAGEMENT | Facility: CLINIC | Age: 65
End: 2025-02-05
Payer: COMMERCIAL

## 2025-03-30 ENCOUNTER — HEALTH MAINTENANCE LETTER (OUTPATIENT)
Age: 65
End: 2025-03-30

## 2025-05-07 ENCOUNTER — HOSPITAL ENCOUNTER (OUTPATIENT)
Dept: CT IMAGING | Facility: CLINIC | Age: 65
Discharge: HOME | End: 2025-05-07
Attending: INTERNAL MEDICINE
Payer: COMMERCIAL

## 2025-05-07 DIAGNOSIS — I26.99 OTHER PULMONARY EMBOLISM WITHOUT ACUTE COR PULMONALE (H): ICD-10-CM

## 2025-05-07 DIAGNOSIS — C20 PRIMARY MALIGNANT NEOPLASM OF RECTUM (H): ICD-10-CM

## 2025-05-07 LAB
CREAT BLD-MCNC: 0.8 MG/DL (ref 0.7–1.2)
EGFRCR SERPLBLD CKD-EPI 2021: >60 ML/MIN/1.73M2

## 2025-05-07 PROCEDURE — 74177 CT ABD & PELVIS W/CONTRAST: CPT

## 2025-05-07 PROCEDURE — 82565 ASSAY OF CREATININE: CPT

## 2025-05-07 PROCEDURE — 250N000011 HC RX IP 250 OP 636: Performed by: INTERNAL MEDICINE

## 2025-05-07 RX ORDER — HEPARIN SODIUM,PORCINE 10 UNIT/ML
5-10 VIAL (ML) INTRAVENOUS
Status: DISCONTINUED | OUTPATIENT
Start: 2025-05-07 | End: 2025-05-07

## 2025-05-07 RX ORDER — HEPARIN SODIUM,PORCINE 10 UNIT/ML
5-10 VIAL (ML) INTRAVENOUS EVERY 24 HOURS
Status: DISCONTINUED | OUTPATIENT
Start: 2025-05-07 | End: 2025-05-07

## 2025-05-07 RX ORDER — IOPAMIDOL 755 MG/ML
90 INJECTION, SOLUTION INTRAVASCULAR ONCE
Status: COMPLETED | OUTPATIENT
Start: 2025-05-07 | End: 2025-05-07

## 2025-05-07 RX ORDER — HEPARIN SODIUM (PORCINE) LOCK FLUSH IV SOLN 100 UNIT/ML 100 UNIT/ML
5-10 SOLUTION INTRAVENOUS
Status: DISCONTINUED | OUTPATIENT
Start: 2025-05-07 | End: 2025-05-07

## 2025-05-07 RX ADMIN — IOPAMIDOL 90 ML: 755 INJECTION, SOLUTION INTRAVENOUS at 09:57

## 2025-05-11 ENCOUNTER — HEALTH MAINTENANCE LETTER (OUTPATIENT)
Age: 65
End: 2025-05-11

## 2025-05-14 ENCOUNTER — TRANSFERRED RECORDS (OUTPATIENT)
Dept: HEALTH INFORMATION MANAGEMENT | Facility: CLINIC | Age: 65
End: 2025-05-14
Payer: COMMERCIAL

## 2025-07-13 ENCOUNTER — HEALTH MAINTENANCE LETTER (OUTPATIENT)
Age: 65
End: 2025-07-13

## 2025-08-06 DIAGNOSIS — Z79.4 TYPE 2 DIABETES MELLITUS WITHOUT COMPLICATION, WITH LONG-TERM CURRENT USE OF INSULIN (H): ICD-10-CM

## 2025-08-06 DIAGNOSIS — E11.9 TYPE 2 DIABETES MELLITUS WITHOUT COMPLICATION, WITH LONG-TERM CURRENT USE OF INSULIN (H): ICD-10-CM

## 2025-08-07 RX ORDER — ATORVASTATIN CALCIUM 40 MG/1
40 TABLET, FILM COATED ORAL DAILY
Qty: 90 TABLET | Refills: 2 | Status: SHIPPED | OUTPATIENT
Start: 2025-08-07

## 2025-08-11 ENCOUNTER — HOSPITAL ENCOUNTER (OUTPATIENT)
Dept: MRI IMAGING | Facility: CLINIC | Age: 65
Discharge: HOME | End: 2025-08-11
Attending: INTERNAL MEDICINE
Payer: COMMERCIAL

## 2025-08-11 ENCOUNTER — HOSPITAL ENCOUNTER (OUTPATIENT)
Dept: CT IMAGING | Facility: CLINIC | Age: 65
Discharge: HOME | End: 2025-08-11
Attending: INTERNAL MEDICINE
Payer: COMMERCIAL

## 2025-08-11 DIAGNOSIS — Z51.81 THERAPEUTIC DRUG MONITORING: ICD-10-CM

## 2025-08-11 DIAGNOSIS — C20 PRIMARY MALIGNANT NEOPLASM OF RECTUM (H): ICD-10-CM

## 2025-08-11 DIAGNOSIS — C20 MALIGNANT NEOPLASM OF RECTUM (H): ICD-10-CM

## 2025-08-11 LAB
CREAT BLD-MCNC: 1 MG/DL (ref 0.7–1.2)
EGFRCR SERPLBLD CKD-EPI 2021: >60 ML/MIN/1.73M2

## 2025-08-11 PROCEDURE — A9581 GADOXETATE DISODIUM INJ: HCPCS | Performed by: INTERNAL MEDICINE

## 2025-08-11 PROCEDURE — 74183 MRI ABD W/O CNTR FLWD CNTR: CPT

## 2025-08-11 PROCEDURE — 250N000011 HC RX IP 250 OP 636: Performed by: INTERNAL MEDICINE

## 2025-08-11 PROCEDURE — 71260 CT THORAX DX C+: CPT

## 2025-08-11 PROCEDURE — 255N000002 HC RX 255 OP 636: Performed by: INTERNAL MEDICINE

## 2025-08-11 PROCEDURE — 82565 ASSAY OF CREATININE: CPT

## 2025-08-11 RX ORDER — IOPAMIDOL 755 MG/ML
100 INJECTION, SOLUTION INTRAVASCULAR ONCE
Status: COMPLETED | OUTPATIENT
Start: 2025-08-11 | End: 2025-08-11

## 2025-08-11 RX ADMIN — IOPAMIDOL 100 ML: 755 INJECTION, SOLUTION INTRAVENOUS at 09:03

## 2025-08-11 RX ADMIN — GADOXETATE DISODIUM 20 ML: 181.43 INJECTION, SOLUTION INTRAVENOUS at 11:06

## 2025-08-18 ENCOUNTER — TRANSFERRED RECORDS (OUTPATIENT)
Dept: HEALTH INFORMATION MANAGEMENT | Facility: CLINIC | Age: 65
End: 2025-08-18
Payer: COMMERCIAL